# Patient Record
Sex: FEMALE | Race: WHITE | Employment: UNEMPLOYED | URBAN - METROPOLITAN AREA
[De-identification: names, ages, dates, MRNs, and addresses within clinical notes are randomized per-mention and may not be internally consistent; named-entity substitution may affect disease eponyms.]

---

## 2017-01-10 ENCOUNTER — OFFICE VISIT (OUTPATIENT)
Dept: PSYCHOLOGY | Age: 57
End: 2017-01-10

## 2017-01-10 DIAGNOSIS — F41.1 GAD (GENERALIZED ANXIETY DISORDER): Primary | ICD-10-CM

## 2017-01-10 PROCEDURE — 90832 PSYTX W PT 30 MINUTES: CPT | Performed by: PSYCHOLOGIST

## 2017-01-11 PROBLEM — F41.1 GAD (GENERALIZED ANXIETY DISORDER): Status: ACTIVE | Noted: 2017-01-11

## 2017-01-30 ENCOUNTER — OFFICE VISIT (OUTPATIENT)
Dept: INTERNAL MEDICINE CLINIC | Age: 57
End: 2017-01-30

## 2017-01-30 VITALS
WEIGHT: 184.4 LBS | HEART RATE: 80 BPM | HEIGHT: 61 IN | SYSTOLIC BLOOD PRESSURE: 110 MMHG | BODY MASS INDEX: 34.81 KG/M2 | DIASTOLIC BLOOD PRESSURE: 70 MMHG

## 2017-01-30 DIAGNOSIS — I10 ESSENTIAL HYPERTENSION: ICD-10-CM

## 2017-01-30 DIAGNOSIS — E11.9 TYPE 2 DIABETES MELLITUS WITHOUT COMPLICATION, WITHOUT LONG-TERM CURRENT USE OF INSULIN (HCC): Primary | ICD-10-CM

## 2017-01-30 DIAGNOSIS — E78.5 HYPERLIPIDEMIA, UNSPECIFIED HYPERLIPIDEMIA TYPE: ICD-10-CM

## 2017-01-30 DIAGNOSIS — F98.8 ADD (ATTENTION DEFICIT DISORDER): ICD-10-CM

## 2017-01-30 LAB
ESTIMATED AVERAGE GLUCOSE: 145.6 MG/DL
HBA1C MFR BLD: 6.7 %

## 2017-01-30 PROCEDURE — 99214 OFFICE O/P EST MOD 30 MIN: CPT | Performed by: INTERNAL MEDICINE

## 2017-01-30 RX ORDER — DEXTROAMPHETAMINE SACCHARATE, AMPHETAMINE ASPARTATE MONOHYDRATE, DEXTROAMPHETAMINE SULFATE AND AMPHETAMINE SULFATE 7.5; 7.5; 7.5; 7.5 MG/1; MG/1; MG/1; MG/1
30 CAPSULE, EXTENDED RELEASE ORAL EVERY MORNING
Qty: 90 CAPSULE | Refills: 0 | Status: SHIPPED | OUTPATIENT
Start: 2017-01-30 | End: 2017-05-01 | Stop reason: SDUPTHER

## 2017-02-13 ENCOUNTER — OFFICE VISIT (OUTPATIENT)
Dept: INTERNAL MEDICINE CLINIC | Age: 57
End: 2017-02-13

## 2017-02-13 ENCOUNTER — OFFICE VISIT (OUTPATIENT)
Dept: PSYCHOLOGY | Age: 57
End: 2017-02-13

## 2017-02-13 VITALS
TEMPERATURE: 97.9 F | WEIGHT: 186.6 LBS | DIASTOLIC BLOOD PRESSURE: 80 MMHG | SYSTOLIC BLOOD PRESSURE: 130 MMHG | HEIGHT: 61 IN | BODY MASS INDEX: 35.23 KG/M2 | HEART RATE: 84 BPM

## 2017-02-13 DIAGNOSIS — F98.8 ADD (ATTENTION DEFICIT DISORDER): ICD-10-CM

## 2017-02-13 DIAGNOSIS — F41.1 GAD (GENERALIZED ANXIETY DISORDER): Primary | ICD-10-CM

## 2017-02-13 DIAGNOSIS — J02.9 SORE THROAT: Primary | ICD-10-CM

## 2017-02-13 LAB — S PYO AG THROAT QL: NORMAL

## 2017-02-13 PROCEDURE — 99213 OFFICE O/P EST LOW 20 MIN: CPT | Performed by: INTERNAL MEDICINE

## 2017-02-13 PROCEDURE — 90832 PSYTX W PT 30 MINUTES: CPT | Performed by: PSYCHOLOGIST

## 2017-02-13 PROCEDURE — 87880 STREP A ASSAY W/OPTIC: CPT | Performed by: INTERNAL MEDICINE

## 2017-02-22 ENCOUNTER — TELEPHONE (OUTPATIENT)
Dept: INTERNAL MEDICINE CLINIC | Age: 57
End: 2017-02-22

## 2017-02-23 RX ORDER — AZITHROMYCIN 250 MG/1
TABLET, FILM COATED ORAL
Qty: 1 PACKET | Refills: 0 | Status: SHIPPED | OUTPATIENT
Start: 2017-02-23 | End: 2017-03-05

## 2017-02-27 RX ORDER — QUETIAPINE FUMARATE 50 MG/1
TABLET, FILM COATED ORAL
Qty: 135 TABLET | Refills: 3 | Status: SHIPPED | OUTPATIENT
Start: 2017-02-27 | End: 2018-03-13 | Stop reason: SDUPTHER

## 2017-03-20 ENCOUNTER — TELEPHONE (OUTPATIENT)
Dept: INTERNAL MEDICINE CLINIC | Age: 57
End: 2017-03-20

## 2017-04-21 DIAGNOSIS — F98.8 ADD (ATTENTION DEFICIT DISORDER): ICD-10-CM

## 2017-04-21 RX ORDER — DEXTROAMPHETAMINE SACCHARATE, AMPHETAMINE ASPARTATE MONOHYDRATE, DEXTROAMPHETAMINE SULFATE AND AMPHETAMINE SULFATE 7.5; 7.5; 7.5; 7.5 MG/1; MG/1; MG/1; MG/1
30 CAPSULE, EXTENDED RELEASE ORAL EVERY MORNING
Qty: 90 CAPSULE | Refills: 0 | Status: CANCELLED | OUTPATIENT
Start: 2017-04-21

## 2017-04-21 RX ORDER — OMEPRAZOLE 40 MG/1
40 CAPSULE, DELAYED RELEASE ORAL DAILY
Qty: 90 CAPSULE | Refills: 3 | Status: SHIPPED | OUTPATIENT
Start: 2017-04-21 | End: 2018-06-29 | Stop reason: SDUPTHER

## 2017-04-21 RX ORDER — VALSARTAN 80 MG/1
80 TABLET ORAL DAILY
Qty: 90 TABLET | Refills: 3 | Status: SHIPPED | OUTPATIENT
Start: 2017-04-21 | End: 2018-05-30 | Stop reason: SDUPTHER

## 2017-05-01 ENCOUNTER — OFFICE VISIT (OUTPATIENT)
Dept: INTERNAL MEDICINE CLINIC | Age: 57
End: 2017-05-01

## 2017-05-01 VITALS
SYSTOLIC BLOOD PRESSURE: 130 MMHG | WEIGHT: 182 LBS | HEIGHT: 61 IN | HEART RATE: 88 BPM | BODY MASS INDEX: 34.36 KG/M2 | DIASTOLIC BLOOD PRESSURE: 86 MMHG

## 2017-05-01 DIAGNOSIS — Z13.31 POSITIVE DEPRESSION SCREENING: ICD-10-CM

## 2017-05-01 DIAGNOSIS — E78.2 MIXED HYPERLIPIDEMIA: ICD-10-CM

## 2017-05-01 DIAGNOSIS — F98.8 ADD (ATTENTION DEFICIT DISORDER): ICD-10-CM

## 2017-05-01 DIAGNOSIS — I10 ESSENTIAL HYPERTENSION: ICD-10-CM

## 2017-05-01 DIAGNOSIS — E11.9 TYPE 2 DIABETES MELLITUS WITHOUT COMPLICATION, WITHOUT LONG-TERM CURRENT USE OF INSULIN (HCC): Primary | ICD-10-CM

## 2017-05-01 PROCEDURE — G8431 POS CLIN DEPRES SCRN F/U DOC: HCPCS | Performed by: INTERNAL MEDICINE

## 2017-05-01 PROCEDURE — 90471 IMMUNIZATION ADMIN: CPT | Performed by: INTERNAL MEDICINE

## 2017-05-01 PROCEDURE — 99214 OFFICE O/P EST MOD 30 MIN: CPT | Performed by: INTERNAL MEDICINE

## 2017-05-01 PROCEDURE — 90746 HEPB VACCINE 3 DOSE ADULT IM: CPT | Performed by: INTERNAL MEDICINE

## 2017-05-01 PROCEDURE — 90715 TDAP VACCINE 7 YRS/> IM: CPT | Performed by: INTERNAL MEDICINE

## 2017-05-01 PROCEDURE — 90472 IMMUNIZATION ADMIN EACH ADD: CPT | Performed by: INTERNAL MEDICINE

## 2017-05-01 RX ORDER — DEXTROAMPHETAMINE SACCHARATE, AMPHETAMINE ASPARTATE MONOHYDRATE, DEXTROAMPHETAMINE SULFATE AND AMPHETAMINE SULFATE 7.5; 7.5; 7.5; 7.5 MG/1; MG/1; MG/1; MG/1
30 CAPSULE, EXTENDED RELEASE ORAL EVERY MORNING
Qty: 90 CAPSULE | Refills: 0 | Status: SHIPPED | OUTPATIENT
Start: 2017-05-01 | End: 2017-07-31 | Stop reason: SDUPTHER

## 2017-05-02 LAB
ESTIMATED AVERAGE GLUCOSE: 137 MG/DL
HBA1C MFR BLD: 6.4 %

## 2017-05-30 ENCOUNTER — PATIENT MESSAGE (OUTPATIENT)
Dept: INTERNAL MEDICINE CLINIC | Age: 57
End: 2017-05-30

## 2017-05-30 DIAGNOSIS — E66.9 OBESITY (BMI 30-39.9): ICD-10-CM

## 2017-05-30 DIAGNOSIS — E11.9 TYPE 2 DIABETES MELLITUS WITHOUT COMPLICATION, WITHOUT LONG-TERM CURRENT USE OF INSULIN (HCC): Primary | ICD-10-CM

## 2017-05-31 ENCOUNTER — NURSE ONLY (OUTPATIENT)
Dept: INTERNAL MEDICINE CLINIC | Age: 57
End: 2017-05-31

## 2017-05-31 DIAGNOSIS — Z23 IMMUNIZATION DUE: Primary | ICD-10-CM

## 2017-05-31 PROCEDURE — 90746 HEPB VACCINE 3 DOSE ADULT IM: CPT | Performed by: INTERNAL MEDICINE

## 2017-05-31 PROCEDURE — 90471 IMMUNIZATION ADMIN: CPT | Performed by: INTERNAL MEDICINE

## 2017-06-02 ENCOUNTER — OFFICE VISIT (OUTPATIENT)
Dept: INTERNAL MEDICINE CLINIC | Age: 57
End: 2017-06-02

## 2017-06-02 VITALS
HEIGHT: 61 IN | BODY MASS INDEX: 34.74 KG/M2 | WEIGHT: 184 LBS | SYSTOLIC BLOOD PRESSURE: 134 MMHG | DIASTOLIC BLOOD PRESSURE: 88 MMHG | HEART RATE: 80 BPM

## 2017-06-02 DIAGNOSIS — R31.9 HEMATURIA: ICD-10-CM

## 2017-06-02 DIAGNOSIS — M54.50 ACUTE RIGHT-SIDED LOW BACK PAIN WITHOUT SCIATICA: Primary | ICD-10-CM

## 2017-06-02 LAB
ALBUMIN SERPL-MCNC: 4.3 G/DL (ref 3.4–5)
ANION GAP SERPL CALCULATED.3IONS-SCNC: 14 MMOL/L (ref 3–16)
BILIRUBIN, POC: NORMAL
BLOOD URINE, POC: NORMAL
BUN BLDV-MCNC: 13 MG/DL (ref 7–20)
CALCIUM SERPL-MCNC: 10.1 MG/DL (ref 8.3–10.6)
CHLORIDE BLD-SCNC: 104 MMOL/L (ref 99–110)
CLARITY, POC: NORMAL
CO2: 24 MMOL/L (ref 21–32)
COLOR, POC: NORMAL
CREAT SERPL-MCNC: 0.6 MG/DL (ref 0.6–1.1)
GFR AFRICAN AMERICAN: >60
GFR NON-AFRICAN AMERICAN: >60
GLUCOSE BLD-MCNC: 99 MG/DL (ref 70–99)
GLUCOSE URINE, POC: NORMAL
KETONES, POC: NORMAL
LEUKOCYTE EST, POC: NORMAL
NITRITE, POC: NORMAL
PH, POC: 5.5
PHOSPHORUS: 2.9 MG/DL (ref 2.5–4.9)
POTASSIUM SERPL-SCNC: 4.7 MMOL/L (ref 3.5–5.1)
PROTEIN, POC: NORMAL
SODIUM BLD-SCNC: 142 MMOL/L (ref 136–145)
SPECIFIC GRAVITY, POC: 1.02
UROBILINOGEN, POC: 0.2

## 2017-06-02 PROCEDURE — 81002 URINALYSIS NONAUTO W/O SCOPE: CPT | Performed by: NURSE PRACTITIONER

## 2017-06-02 PROCEDURE — 99213 OFFICE O/P EST LOW 20 MIN: CPT | Performed by: NURSE PRACTITIONER

## 2017-06-04 LAB — URINE CULTURE, ROUTINE: NORMAL

## 2017-06-05 ASSESSMENT — ENCOUNTER SYMPTOMS
BACK PAIN: 1
VOMITING: 0
GASTROINTESTINAL NEGATIVE: 1
DIARRHEA: 0
CONSTIPATION: 0
NAUSEA: 0
SHORTNESS OF BREATH: 0

## 2017-06-07 ENCOUNTER — HOSPITAL ENCOUNTER (OUTPATIENT)
Dept: CT IMAGING | Age: 57
Discharge: HOME | End: 2017-06-07
Attending: INTERNAL MEDICINE | Admitting: INTERNAL MEDICINE

## 2017-06-07 DIAGNOSIS — M54.50 LOW BACK PAIN: ICD-10-CM

## 2017-06-07 DIAGNOSIS — R31.9 HEMATURIA: ICD-10-CM

## 2017-06-07 DIAGNOSIS — M54.50 ACUTE RIGHT-SIDED LOW BACK PAIN WITHOUT SCIATICA: ICD-10-CM

## 2017-06-08 ENCOUNTER — TELEPHONE (OUTPATIENT)
Dept: RHEUMATOLOGY | Age: 57
End: 2017-06-08

## 2017-06-08 DIAGNOSIS — R10.11 RUQ ABDOMINAL PAIN: Primary | ICD-10-CM

## 2017-06-08 RX ORDER — TRAMADOL HYDROCHLORIDE 50 MG/1
50 TABLET ORAL EVERY 8 HOURS PRN
Qty: 25 TABLET | Refills: 0 | Status: SHIPPED | OUTPATIENT
Start: 2017-06-08 | End: 2018-01-30 | Stop reason: ALTCHOICE

## 2017-07-31 ENCOUNTER — OFFICE VISIT (OUTPATIENT)
Dept: INTERNAL MEDICINE CLINIC | Age: 57
End: 2017-07-31

## 2017-07-31 VITALS
DIASTOLIC BLOOD PRESSURE: 70 MMHG | BODY MASS INDEX: 33.45 KG/M2 | SYSTOLIC BLOOD PRESSURE: 116 MMHG | HEIGHT: 62 IN | HEART RATE: 88 BPM | WEIGHT: 181.8 LBS

## 2017-07-31 DIAGNOSIS — R21 RASH: ICD-10-CM

## 2017-07-31 DIAGNOSIS — Z23 NEED FOR PROPHYLACTIC VACCINATION AGAINST STREPTOCOCCUS PNEUMONIAE (PNEUMOCOCCUS): ICD-10-CM

## 2017-07-31 DIAGNOSIS — Z12.31 ENCOUNTER FOR SCREENING MAMMOGRAM FOR BREAST CANCER: ICD-10-CM

## 2017-07-31 DIAGNOSIS — I10 ESSENTIAL HYPERTENSION: ICD-10-CM

## 2017-07-31 DIAGNOSIS — F98.8 ADD (ATTENTION DEFICIT DISORDER): ICD-10-CM

## 2017-07-31 DIAGNOSIS — E11.9 TYPE 2 DIABETES MELLITUS WITHOUT COMPLICATION, WITHOUT LONG-TERM CURRENT USE OF INSULIN (HCC): Primary | ICD-10-CM

## 2017-07-31 DIAGNOSIS — E78.2 MIXED HYPERLIPIDEMIA: ICD-10-CM

## 2017-07-31 DIAGNOSIS — Z11.59 NEED FOR HEPATITIS C SCREENING TEST: ICD-10-CM

## 2017-07-31 DIAGNOSIS — Z11.4 SCREENING FOR HIV WITHOUT PRESENCE OF RISK FACTORS: ICD-10-CM

## 2017-07-31 PROCEDURE — 99214 OFFICE O/P EST MOD 30 MIN: CPT | Performed by: INTERNAL MEDICINE

## 2017-07-31 PROCEDURE — 90471 IMMUNIZATION ADMIN: CPT | Performed by: INTERNAL MEDICINE

## 2017-07-31 PROCEDURE — 90732 PPSV23 VACC 2 YRS+ SUBQ/IM: CPT | Performed by: INTERNAL MEDICINE

## 2017-07-31 RX ORDER — DEXTROAMPHETAMINE SACCHARATE, AMPHETAMINE ASPARTATE MONOHYDRATE, DEXTROAMPHETAMINE SULFATE AND AMPHETAMINE SULFATE 7.5; 7.5; 7.5; 7.5 MG/1; MG/1; MG/1; MG/1
30 CAPSULE, EXTENDED RELEASE ORAL EVERY MORNING
Qty: 90 CAPSULE | Refills: 0 | Status: SHIPPED | OUTPATIENT
Start: 2017-07-31 | End: 2017-10-30 | Stop reason: SDUPTHER

## 2017-08-01 LAB
ESTIMATED AVERAGE GLUCOSE: 159.9 MG/DL
HBA1C MFR BLD: 7.2 %
HEPATITIS C ANTIBODY INTERPRETATION: NORMAL
HIV-1 AND HIV-2 ANTIBODIES: NORMAL

## 2017-08-08 ENCOUNTER — TELEPHONE (OUTPATIENT)
Dept: BARIATRICS/WEIGHT MGMT | Age: 57
End: 2017-08-08

## 2017-09-07 RX ORDER — SIMVASTATIN 20 MG
20 TABLET ORAL NIGHTLY
Qty: 90 TABLET | Refills: 3 | Status: SHIPPED | OUTPATIENT
Start: 2017-09-07 | End: 2018-06-29 | Stop reason: SDUPTHER

## 2017-09-14 RX ORDER — CLOTRIMAZOLE AND BETAMETHASONE DIPROPIONATE 10; .64 MG/G; MG/G
CREAM TOPICAL
Qty: 60 G | Refills: 1 | Status: SHIPPED | OUTPATIENT
Start: 2017-09-14 | End: 2018-05-04 | Stop reason: ALTCHOICE

## 2017-10-09 ENCOUNTER — TELEPHONE (OUTPATIENT)
Dept: INTERNAL MEDICINE CLINIC | Age: 57
End: 2017-10-09

## 2017-10-09 DIAGNOSIS — Z01.419 ROUTINE GYNECOLOGICAL EXAMINATION: Primary | ICD-10-CM

## 2017-10-09 DIAGNOSIS — Z01.419 ENCOUNTER FOR GYNECOLOGICAL EXAMINATION: Primary | ICD-10-CM

## 2017-10-09 DIAGNOSIS — Z12.39 BREAST CANCER SCREENING: ICD-10-CM

## 2017-10-09 NOTE — TELEPHONE ENCOUNTER
Pt calling needs an order for Mammogram. Pt also asking for a referral to see a Gynecologist. Please advise.

## 2017-10-09 NOTE — TELEPHONE ENCOUNTER
Pt called stating she needs a  referral for Dr. Cindi Caraballo, her ob/gyn. It is for an annual exam. The dr is located at [address] Her appt is tomorrow.      Fax #: [phone]

## 2017-10-30 ENCOUNTER — OFFICE VISIT (OUTPATIENT)
Dept: INTERNAL MEDICINE CLINIC | Age: 57
End: 2017-10-30

## 2017-10-30 VITALS
HEIGHT: 62 IN | WEIGHT: 182 LBS | DIASTOLIC BLOOD PRESSURE: 74 MMHG | BODY MASS INDEX: 33.49 KG/M2 | HEART RATE: 88 BPM | SYSTOLIC BLOOD PRESSURE: 120 MMHG

## 2017-10-30 DIAGNOSIS — G89.29 CHRONIC BILATERAL THORACIC BACK PAIN: ICD-10-CM

## 2017-10-30 DIAGNOSIS — E78.2 MIXED HYPERLIPIDEMIA: ICD-10-CM

## 2017-10-30 DIAGNOSIS — M54.6 CHRONIC BILATERAL THORACIC BACK PAIN: ICD-10-CM

## 2017-10-30 DIAGNOSIS — E11.9 TYPE 2 DIABETES MELLITUS WITHOUT COMPLICATION, WITHOUT LONG-TERM CURRENT USE OF INSULIN (HCC): Primary | ICD-10-CM

## 2017-10-30 DIAGNOSIS — I10 ESSENTIAL HYPERTENSION: ICD-10-CM

## 2017-10-30 DIAGNOSIS — F98.8 ATTENTION DEFICIT DISORDER, UNSPECIFIED HYPERACTIVITY PRESENCE: ICD-10-CM

## 2017-10-30 PROCEDURE — 99214 OFFICE O/P EST MOD 30 MIN: CPT | Performed by: INTERNAL MEDICINE

## 2017-10-30 RX ORDER — DEXTROAMPHETAMINE SACCHARATE, AMPHETAMINE ASPARTATE MONOHYDRATE, DEXTROAMPHETAMINE SULFATE AND AMPHETAMINE SULFATE 7.5; 7.5; 7.5; 7.5 MG/1; MG/1; MG/1; MG/1
30 CAPSULE, EXTENDED RELEASE ORAL EVERY MORNING
Qty: 90 CAPSULE | Refills: 0 | Status: SHIPPED | OUTPATIENT
Start: 2017-10-30 | End: 2018-01-30 | Stop reason: SDUPTHER

## 2017-11-30 ENCOUNTER — NURSE ONLY (OUTPATIENT)
Dept: INTERNAL MEDICINE CLINIC | Age: 57
End: 2017-11-30

## 2017-11-30 DIAGNOSIS — Z23 NEED FOR HEPATITIS B VACCINATION: Primary | ICD-10-CM

## 2017-11-30 PROCEDURE — 90471 IMMUNIZATION ADMIN: CPT | Performed by: INTERNAL MEDICINE

## 2017-11-30 PROCEDURE — 90746 HEPB VACCINE 3 DOSE ADULT IM: CPT | Performed by: INTERNAL MEDICINE

## 2018-01-24 ENCOUNTER — TELEPHONE (OUTPATIENT)
Dept: INTERNAL MEDICINE CLINIC | Age: 58
End: 2018-01-24

## 2018-01-24 DIAGNOSIS — E11.9 TYPE 2 DIABETES MELLITUS WITHOUT COMPLICATION, WITHOUT LONG-TERM CURRENT USE OF INSULIN (HCC): ICD-10-CM

## 2018-01-24 DIAGNOSIS — I10 ESSENTIAL HYPERTENSION: Primary | ICD-10-CM

## 2018-01-24 DIAGNOSIS — E78.00 HYPERCHOLESTEROLEMIA: ICD-10-CM

## 2018-01-24 NOTE — TELEPHONE ENCOUNTER
Dr. Noel Snog: This patient has an upcoming appointment with you for Diabetes and Hyperlipidemia. In planning for that visit I have completed the following pre-visit planning:     Pre-Visit Planning Checklist:  Patient contacted: yes  Verified patient by name and date of birth: yes    Health Maintenance items reviewed:    No pre-visit planning health maintenance topics to review at this time    Labs and procedures pended: Fasting Hemoglobin A1C , Urine Microalbumin, Lipid Panel  and Renal Panel    Labs and procedures discussed with patient: yes  Reminded patient to check with their insurance company about coverage for lab tests and lab location: yes    Preliminary Medication Reconciliation: was performed. Reminded patient to bring medications to appointment: no    Reminded patient to arrive early: yes    Other notes: Patient declined having lab work completed prior to her upcoming appointment. Patient reports that she is not taking Metformin and will not be restarting it. Please complete the med-reconciliation and sign the appropriate labs as soon as possible.       George Ace  Pre-Services Specialist

## 2018-01-30 ENCOUNTER — OFFICE VISIT (OUTPATIENT)
Dept: INTERNAL MEDICINE CLINIC | Age: 58
End: 2018-01-30

## 2018-01-30 VITALS
SYSTOLIC BLOOD PRESSURE: 144 MMHG | HEIGHT: 62 IN | DIASTOLIC BLOOD PRESSURE: 84 MMHG | BODY MASS INDEX: 33.82 KG/M2 | HEART RATE: 68 BPM | WEIGHT: 183.8 LBS

## 2018-01-30 DIAGNOSIS — Z23 NEED FOR INFLUENZA VACCINATION: ICD-10-CM

## 2018-01-30 DIAGNOSIS — E78.2 MIXED HYPERLIPIDEMIA: ICD-10-CM

## 2018-01-30 DIAGNOSIS — E11.9 TYPE 2 DIABETES MELLITUS WITHOUT COMPLICATION, WITHOUT LONG-TERM CURRENT USE OF INSULIN (HCC): Primary | ICD-10-CM

## 2018-01-30 DIAGNOSIS — I10 ESSENTIAL HYPERTENSION: ICD-10-CM

## 2018-01-30 DIAGNOSIS — E78.00 HYPERCHOLESTEROLEMIA: ICD-10-CM

## 2018-01-30 DIAGNOSIS — E11.9 TYPE 2 DIABETES MELLITUS WITHOUT COMPLICATION, WITHOUT LONG-TERM CURRENT USE OF INSULIN (HCC): ICD-10-CM

## 2018-01-30 DIAGNOSIS — F90.0 ATTENTION DEFICIT HYPERACTIVITY DISORDER (ADHD), PREDOMINANTLY INATTENTIVE TYPE: ICD-10-CM

## 2018-01-30 DIAGNOSIS — H92.01 RIGHT EAR PAIN: ICD-10-CM

## 2018-01-30 LAB
ALBUMIN SERPL-MCNC: 4 G/DL (ref 3.4–5)
ANION GAP SERPL CALCULATED.3IONS-SCNC: 14 MMOL/L (ref 3–16)
BUN BLDV-MCNC: 11 MG/DL (ref 7–20)
CALCIUM SERPL-MCNC: 9.6 MG/DL (ref 8.3–10.6)
CHLORIDE BLD-SCNC: 104 MMOL/L (ref 99–110)
CHOLESTEROL, FASTING: 163 MG/DL (ref 0–199)
CO2: 22 MMOL/L (ref 21–32)
CREAT SERPL-MCNC: 0.6 MG/DL (ref 0.6–1.1)
CREATININE URINE: 133.9 MG/DL (ref 28–259)
GFR AFRICAN AMERICAN: >60
GFR NON-AFRICAN AMERICAN: >60
GLUCOSE BLD-MCNC: 149 MG/DL (ref 70–99)
HDLC SERPL-MCNC: 42 MG/DL (ref 40–60)
LDL CHOLESTEROL CALCULATED: 93 MG/DL
MICROALBUMIN UR-MCNC: 1.6 MG/DL
MICROALBUMIN/CREAT UR-RTO: 11.9 MG/G (ref 0–30)
PHOSPHORUS: 2.7 MG/DL (ref 2.5–4.9)
POTASSIUM SERPL-SCNC: 4.2 MMOL/L (ref 3.5–5.1)
SODIUM BLD-SCNC: 140 MMOL/L (ref 136–145)
TRIGLYCERIDE, FASTING: 138 MG/DL (ref 0–150)
VLDLC SERPL CALC-MCNC: 28 MG/DL

## 2018-01-30 PROCEDURE — 90686 IIV4 VACC NO PRSV 0.5 ML IM: CPT | Performed by: INTERNAL MEDICINE

## 2018-01-30 PROCEDURE — 99214 OFFICE O/P EST MOD 30 MIN: CPT | Performed by: INTERNAL MEDICINE

## 2018-01-30 PROCEDURE — 90471 IMMUNIZATION ADMIN: CPT | Performed by: INTERNAL MEDICINE

## 2018-01-30 RX ORDER — DEXTROAMPHETAMINE SACCHARATE, AMPHETAMINE ASPARTATE MONOHYDRATE, DEXTROAMPHETAMINE SULFATE AND AMPHETAMINE SULFATE 7.5; 7.5; 7.5; 7.5 MG/1; MG/1; MG/1; MG/1
30 CAPSULE, EXTENDED RELEASE ORAL EVERY MORNING
Qty: 90 CAPSULE | Refills: 0 | Status: SHIPPED | OUTPATIENT
Start: 2018-01-30 | End: 2018-04-12 | Stop reason: SDUPTHER

## 2018-01-31 LAB
ESTIMATED AVERAGE GLUCOSE: 154.2 MG/DL
HBA1C MFR BLD: 7 %

## 2018-02-05 ENCOUNTER — TELEPHONE (OUTPATIENT)
Dept: INTERNAL MEDICINE CLINIC | Age: 58
End: 2018-02-05

## 2018-02-06 ENCOUNTER — TELEPHONE (OUTPATIENT)
Dept: INTERNAL MEDICINE CLINIC | Age: 58
End: 2018-02-06

## 2018-02-27 ENCOUNTER — TELEPHONE (OUTPATIENT)
Dept: INTERNAL MEDICINE CLINIC | Age: 58
End: 2018-02-27

## 2018-02-27 RX ORDER — SUMATRIPTAN 20 MG/1
1 SPRAY NASAL DAILY PRN
Qty: 1 INHALER | Refills: 3 | Status: SHIPPED | OUTPATIENT
Start: 2018-02-27 | End: 2018-06-29 | Stop reason: SDUPTHER

## 2018-03-13 ENCOUNTER — PATIENT MESSAGE (OUTPATIENT)
Dept: INTERNAL MEDICINE CLINIC | Age: 58
End: 2018-03-13

## 2018-03-13 RX ORDER — QUETIAPINE FUMARATE 50 MG/1
75 TABLET, FILM COATED ORAL DAILY
Qty: 135 TABLET | Refills: 3 | Status: SHIPPED | OUTPATIENT
Start: 2018-03-13 | End: 2018-11-02 | Stop reason: ALTCHOICE

## 2018-03-15 ENCOUNTER — PATIENT MESSAGE (OUTPATIENT)
Dept: INTERNAL MEDICINE CLINIC | Age: 58
End: 2018-03-15

## 2018-03-15 NOTE — TELEPHONE ENCOUNTER
From: [facility]  To: Rock Grey MD  Sent: 3/15/2018 1:29 PM EDT  Subject: Prescription Question    The pharmacy said the RX request requires prior authorization. I will also require a new louie/strips to go with the RX. Please and thank you.  ----- Message -----  From: Rock Grey MD  Sent: 3/15/2018 1:00 PM EDT  To: [facility]  Subject: RE: Prescription Question  OK. I [facility] a [facility] to [facility]. Rex Holstein     ----- Message -----   [facility]   Sent: 3/15/2018 12:54 PM EDT   To: Rock Grey MD  Subject: RE: Prescription Question    Will you please call in a 30 day RX to [facility]? We can see how it goes , then call in 90 day RX to [facility] if all goes well.  ----- Message -----  From: Rock Grey MD  Sent: 3/15/2018 12:03 PM EDT  To: [facility]  Subject: RE: Prescription Question  Victoza is an excellent option. I can write a 30 day or 90 day prescription. Please let me know which is better for you. Also, let me know where you would like the prescription sent. Rex Holstein     ----- Message -----   From: James Miller   Sent: 3/15/2018 12:00 PM EDT   To: Rock Grey MD  Subject: RE: Prescription Question    I spoke with my insurance provider/[facility]. Januvia is not available in injection form. Changes to insurance coverage makes Victoza affordable but I didn't understand what they meant by   \"2x3\" \"3x3\". Either way, it depends on how the prescription is written - the price is still the same for a 90 day supply. Would you want to write the prescription for Victoza? Would you want to try out a 30 day supply first to see how it goes then write it for 90 days?  ----- Message -----  From: Rock Grey MD  Sent: 3/14/2018 9:16 PM EDT  To: [facility]  Subject: RE: Prescription Question  Ms. Kevin Garcia does not cause weight gain and can be effective as a stand alone treatment. I am more comfortable prescribing this compared to Symlin, as I am more familiar with this medication. I have seen many patients have good results with Januvia. Spenser Moore    ----- Message -----   From: General Mascorro   Sent: 3/13/2018 7:33 PM EDT   To: Dontae Harrington MD  Subject: RE: Prescription Question    I did some investigating: [email or web address]. pdf  It seems that this drug is intended for use in combination with Metformin and/or Pioglitazone. I have negative effects with Metformin. One of the side effects of Pioglitazone seems to be weight gain, and I did not see information on a an injectable 30 day version of this drug. I checked with my insurance, Yadi No is not available in injectable form, only tablet. SYMLIN® (pramlintide acetate) is available in injection form, and its RX can be called in for 30 or 90 days. Do you think I can try it for 30 days w/monitoring of bloodsugars to see how it works out? I am not sure I want to try something that adds weight, and that is a 30 day dose.  ----- Message -----  From: Dontae Harrington MD  Sent: 3/13/2018 4:54 PM EDT  To: [facility]  Subject: RE: Prescription Question  Ms. Chandra Welch can definitely be a cause of elevated glucose levels. I would suggest a trial of a medication called Januvia since Victoza was too expensive. This is a pill that is taken once daily by mouth. Would you like me to send in a prescription to your pharmacy? Spenser Moore     ----- Message -----   From:  [employer]   Sent: 3/13/2018 2:32 PM EDT   To: Dontae Harrington MD  Subject: Prescription Question    Dr. Linda Mirza,    My bloodsugar has been running higher than usual even though I have been managing well through self moderation. For the past two weeks it has been running in the range of 160-180.    I am staying on task with my meds, taking a multivitamin (Centrum for women) plus

## 2018-04-12 DIAGNOSIS — R21 RASH: Primary | ICD-10-CM

## 2018-04-12 RX ORDER — DEXTROAMPHETAMINE SACCHARATE, AMPHETAMINE ASPARTATE MONOHYDRATE, DEXTROAMPHETAMINE SULFATE AND AMPHETAMINE SULFATE 7.5; 7.5; 7.5; 7.5 MG/1; MG/1; MG/1; MG/1
30 CAPSULE, EXTENDED RELEASE ORAL EVERY MORNING
Qty: 90 CAPSULE | Refills: 0 | Status: SHIPPED | OUTPATIENT
Start: 2018-04-12 | End: 2018-05-04 | Stop reason: SDUPTHER

## 2018-05-04 ENCOUNTER — OFFICE VISIT (OUTPATIENT)
Dept: INTERNAL MEDICINE CLINIC | Age: 58
End: 2018-05-04

## 2018-05-04 VITALS
HEIGHT: 62 IN | WEIGHT: 180.6 LBS | SYSTOLIC BLOOD PRESSURE: 122 MMHG | HEART RATE: 96 BPM | DIASTOLIC BLOOD PRESSURE: 84 MMHG | BODY MASS INDEX: 33.23 KG/M2

## 2018-05-04 DIAGNOSIS — I10 ESSENTIAL HYPERTENSION: ICD-10-CM

## 2018-05-04 DIAGNOSIS — E11.9 TYPE 2 DIABETES MELLITUS WITHOUT COMPLICATION, WITHOUT LONG-TERM CURRENT USE OF INSULIN (HCC): Primary | ICD-10-CM

## 2018-05-04 DIAGNOSIS — E78.2 MIXED HYPERLIPIDEMIA: ICD-10-CM

## 2018-05-04 DIAGNOSIS — F90.0 ATTENTION DEFICIT HYPERACTIVITY DISORDER (ADHD), PREDOMINANTLY INATTENTIVE TYPE: ICD-10-CM

## 2018-05-04 LAB — HBA1C MFR BLD: 7.1 %

## 2018-05-04 PROCEDURE — 83036 HEMOGLOBIN GLYCOSYLATED A1C: CPT | Performed by: INTERNAL MEDICINE

## 2018-05-04 PROCEDURE — 99214 OFFICE O/P EST MOD 30 MIN: CPT | Performed by: INTERNAL MEDICINE

## 2018-05-04 RX ORDER — DEXTROAMPHETAMINE SACCHARATE, AMPHETAMINE ASPARTATE MONOHYDRATE, DEXTROAMPHETAMINE SULFATE AND AMPHETAMINE SULFATE 7.5; 7.5; 7.5; 7.5 MG/1; MG/1; MG/1; MG/1
30 CAPSULE, EXTENDED RELEASE ORAL EVERY MORNING
Qty: 90 CAPSULE | Refills: 0 | Status: SHIPPED | OUTPATIENT
Start: 2018-05-04 | End: 2018-08-03 | Stop reason: SDUPTHER

## 2018-06-29 RX ORDER — SIMVASTATIN 20 MG
20 TABLET ORAL NIGHTLY
Qty: 90 TABLET | Refills: 3 | Status: SHIPPED | OUTPATIENT
Start: 2018-06-29 | End: 2019-05-01 | Stop reason: SDUPTHER

## 2018-06-29 RX ORDER — SUMATRIPTAN 20 MG/1
1 SPRAY NASAL DAILY PRN
Qty: 1 INHALER | Refills: 3 | Status: SHIPPED | OUTPATIENT
Start: 2018-06-29 | End: 2018-11-30 | Stop reason: SDUPTHER

## 2018-06-29 RX ORDER — OMEPRAZOLE 40 MG/1
40 CAPSULE, DELAYED RELEASE ORAL DAILY
Qty: 90 CAPSULE | Refills: 3 | Status: SHIPPED | OUTPATIENT
Start: 2018-06-29

## 2018-07-18 ENCOUNTER — PAT TELEPHONE (OUTPATIENT)
Dept: PREADMISSION TESTING | Age: 58
End: 2018-07-18

## 2018-07-18 VITALS — WEIGHT: 180 LBS | BODY MASS INDEX: 33.13 KG/M2 | HEIGHT: 62 IN

## 2018-07-18 NOTE — PROGRESS NOTES
[address] [facility] time__0915__________        Surgery time__1015__________    Take the following medications with a sip of water:    Do not eat or drink anything after 12:00 midnight prior to your surgery. This includes water chewing gum, mints and ice chips. You may brush your teeth and gargle the morning of your surgery, but do not swallow the water      You may be asked to stop blood thinners such as Coumadin, Plavix, Fragmin, Lovenox, etc., or any anti-inflammatories such as:  Aspirin, Ibuprofen, Advil, Naproxen prior to your surgery. We also ask that you stop any OTC medications such as fish oil, vitamin E, glucosamine, garlic, Multivitamins, COQ 10, etc.    We ask that you do not smoke 24 hours prior to surgery  We ask that you do not  drink any alcoholic beverages 24 hours prior to surgery     You must make arrangements for a responsible adult to take you home after your surgery. For your safety you will not be allowed to leave alone or drive yourself home. Your surgery will be cancelled if you do not have a ride home. Also for your safety, it is strongly suggested that someone stay with you the first 24 hours after your surgery. A parent or legal guardian must accompany a child scheduled for surgery and plan to stay at the hospital until the child is discharged. Please do not bring other children with you. For your comfort, please wear simple loose fitting clothing to the hospital.  Please do not bring valuables. Do not wear any make-up or nail polish on your fingers or toes      For your safety, please do not wear any jewelry or body piercing's on the day of surgery. All jewelry must be removed. If you have dentures, they will be removed before going to operating room. For your convenience, we will provide you with a container. If you wear contact lenses or glasses, they will be removed, please bring a case for them.      If

## 2018-07-19 ENCOUNTER — TELEPHONE (OUTPATIENT)
Dept: INTERNAL MEDICINE CLINIC | Age: 58
End: 2018-07-19

## 2018-07-19 NOTE — TELEPHONE ENCOUNTER
Attempted to contact patient on 7/19/2018. Result: left message on the patient's voicemail asking patient to return my call. Pre-Visit planning not completed.        Shayna Cuevas  Patient Services Specialist  [phone]

## 2018-07-20 ENCOUNTER — HOSPITAL ENCOUNTER (OUTPATIENT)
Dept: ENDOSCOPY | Age: 58
Discharge: HOME | End: 2018-07-20
Attending: INTERNAL MEDICINE | Admitting: INTERNAL MEDICINE

## 2018-07-20 VITALS
TEMPERATURE: 98.8 F | RESPIRATION RATE: 16 BRPM | WEIGHT: 179 LBS | HEIGHT: 62 IN | OXYGEN SATURATION: 98 % | BODY MASS INDEX: 32.94 KG/M2 | DIASTOLIC BLOOD PRESSURE: 79 MMHG | SYSTOLIC BLOOD PRESSURE: 136 MMHG | HEART RATE: 97 BPM

## 2018-07-20 DIAGNOSIS — R14.0 ABDOMINAL DISTENSION (GASEOUS): ICD-10-CM

## 2018-07-20 LAB
GLUCOSE BLD-MCNC: 86 MG/DL (ref 70–99)
PERFORMED ON: NORMAL

## 2018-07-20 RX ORDER — SODIUM CHLORIDE 9 MG/ML
INJECTION, SOLUTION INTRAVENOUS CONTINUOUS
Status: DISCONTINUED | OUTPATIENT
Start: 2018-07-20 | End: 2018-07-21 | Stop reason: HOSPADM

## 2018-07-20 RX ORDER — SODIUM CHLORIDE 0.9 % (FLUSH) 0.9 %
10 SYRINGE (ML) INJECTION PRN
Status: DISCONTINUED | OUTPATIENT
Start: 2018-07-20 | End: 2018-07-21 | Stop reason: HOSPADM

## 2018-07-20 RX ORDER — SODIUM CHLORIDE 0.9 % (FLUSH) 0.9 %
10 SYRINGE (ML) INJECTION EVERY 12 HOURS SCHEDULED
Status: DISCONTINUED | OUTPATIENT
Start: 2018-07-20 | End: 2018-07-21 | Stop reason: HOSPADM

## 2018-07-20 RX ADMIN — SODIUM CHLORIDE: 9 INJECTION, SOLUTION INTRAVENOUS at 09:54

## 2018-07-20 ASSESSMENT — LIFESTYLE VARIABLES: SMOKING_STATUS: 0

## 2018-07-20 ASSESSMENT — PAIN - FUNCTIONAL ASSESSMENT: PAIN_FUNCTIONAL_ASSESSMENT: 0-10

## 2018-07-20 ASSESSMENT — PAIN SCALES - GENERAL: PAINLEVEL_OUTOF10: 0

## 2018-07-20 NOTE — ANESTHESIA PRE-OP
valsartan (DIOVAN) 80 MG tablet TAKE 1 TABLET DAILY 90 tablet 0    amphetamine-dextroamphetamine (ADDERALL XR) 30 MG extended release capsule Take 1 capsule by mouth every morning for 90 days. . 90 capsule 0    glucose blood VI test strips (ASCENSIA AUTODISC VI;ONE TOUCH ULTRA TEST VI) strip 1 each by In Vitro route daily As needed. 100 each 3    Blood Glucose Monitoring Suppl DEEPA 1 Device by Does not apply route daily 1 Device 0    QUEtiapine (SEROQUEL) 50 MG tablet Take 1.5 tablets by mouth daily 135 tablet 3     No current facility-administered medications on file prior to encounter. Current Outpatient Prescriptions   Medication Sig Dispense Refill    omeprazole (PRILOSEC) 40 MG delayed release capsule Take 1 capsule by mouth daily 90 capsule 3    simvastatin (ZOCOR) 20 MG tablet Take 1 tablet by mouth nightly 90 tablet 3    SUMAtriptan (IMITREX) 20 MG/ACT nasal spray 1 spray by Nasal route daily as needed for Migraine 1 Inhaler 3    Exenatide (BYDUREON) 2 MG PEN Inject 1 pen into the skin once a week 12 pen 3    valsartan (DIOVAN) 80 MG tablet TAKE 1 TABLET DAILY 90 tablet 0    amphetamine-dextroamphetamine (ADDERALL XR) 30 MG extended release capsule Take 1 capsule by mouth every morning for 90 days. . 90 capsule 0    glucose blood VI test strips (ASCENSIA AUTODISC VI;ONE TOUCH ULTRA TEST VI) strip 1 each by In Vitro route daily As needed. 100 each 3    Blood Glucose Monitoring Suppl DEEPA 1 Device by Does not apply route daily 1 Device 0    QUEtiapine (SEROQUEL) 50 MG tablet Take 1.5 tablets by mouth daily 135 tablet 3     No current facility-administered medications for this encounter. Vital Signs (Current) There were no vitals filed for this visit. Vital Signs Statistics (for past 48 hrs)     No Data Recorded    BP Readings from Last 3 Encounters:   05/04/18 122/84   01/30/18 (!) 144/84   10/30/17 120/74     BMI  There is no height or weight on file to calculate BMI.   Estimated body mass index is 32.92 kg/m² as calculated from the following:    Height as of 7/18/18: 5' 2\" (1.575 m). Weight as of 7/18/18: 180 lb (81.6 kg). CBC   Lab Results   Component Value Date    WBC 10.7 09/14/2015    RBC 4.78 09/14/2015    HGB 13.5 09/14/2015    HCT 41.8 09/14/2015    MCV 87.5 09/14/2015    RDW 14.2 09/14/2015     09/14/2015     CMP    Lab Results   Component Value Date     01/30/2018    K 4.2 01/30/2018     01/30/2018    CO2 22 01/30/2018    BUN 11 01/30/2018    CREATININE 0.6 01/30/2018    GFRAA >60 01/30/2018    AGRATIO 1.4 03/03/2014    LABGLOM >60 01/30/2018    GLUCOSE 149 01/30/2018    PROT 7.1 03/03/2014    CALCIUM 9.6 01/30/2018    BILITOT 0.4 03/03/2014    ALKPHOS 132 03/03/2014    AST 57 03/03/2014    ALT 74 03/03/2014     BMP    Lab Results   Component Value Date     01/30/2018    K 4.2 01/30/2018     01/30/2018    CO2 22 01/30/2018    BUN 11 01/30/2018    CREATININE 0.6 01/30/2018    CALCIUM 9.6 01/30/2018    GFRAA >60 01/30/2018    LABGLOM >60 01/30/2018    GLUCOSE 149 01/30/2018     POCGlucose  No results for input(s): GLUCOSE in the last 72 hours.    Coags  No results found for: PROTIME, INR, APTT  HCG (If Applicable) No results found for: PREGTESTUR, PREGSERUM, HCG, HCGQUANT   ABGs No results found for: PHART, PO2ART, NON2IJL, IZM1MBR, BEART, S9YVURDQ   Type & Screen (If Applicable)  No results found for: LABABO, LABRH                         BMI: Wt Readings from Last 3 Encounters:       NPO Status:  >8h                          Anesthesia Evaluation  Patient summary reviewed no history of anesthetic complications:   Airway: Mallampati: II  TM distance: >3 FB     Mouth opening: > = 3 FB Dental:          Pulmonary:Negative Pulmonary ROS breath sounds clear to auscultation      (-) COPD, asthma and not a current smoker                           Cardiovascular:  Exercise tolerance: good (>4 METS),   (+) hypertension:, hyperlipidemia        Rhythm: regular  Rate: normal                    Neuro/Psych:   (+) psychiatric history: stable with treatmentdepression/anxiety              ROS comment: Neurofibromatosis 2 GI/Hepatic/Renal:   (+) GERD:, liver disease:,           Endo/Other:    (+) DiabetesType II DM, , .                 Abdominal:           Vascular:     - DVT and PE. Anesthesia Plan      TIVA     ASA 3       Induction: intravenous. Anesthetic plan and risks discussed with patient. Plan discussed with CRNA. This pre-anesthesia assessment may be used as a history and physical.    DOS STAFF ADDENDUM:    Pt seen and examined, chart reviewed (including anesthesia, drug and allergy history). No interval changes to history and physical examination. Anesthetic plan, risks, benefits, alternatives, and personnel involved discussed with patient. Patient verbalized an understanding and agrees to proceed.       Chas Flor MD  July 20, 2018  9:29 AM

## 2018-07-20 NOTE — PROGRESS NOTES
Dr. Josep Pichardo here. Discharge instructions discussed with pt. And her . Understanding verbalized.

## 2018-07-20 NOTE — ANESTHESIA POST-OP
[facility] Department of Anesthesiology  Post-Anesthesia Note       Name:  Lauren Ledezma                                  Age:  62 y.o. MRN:  [id number]     Last Vitals & Oxygen Saturation: /79   Pulse 97   Temp 98.8 °F (37.1 °C) (Temporal)   Resp 16   Ht 5' 2\" (1.575 m)   Wt 179 lb (81.2 kg)   SpO2 98%   BMI 32.74 kg/m²   Patient Vitals for the past 4 hrs:   BP Temp Temp src Pulse Resp SpO2   07/20/18 1044 136/79 - - 97 16 98 %   07/20/18 1034 123/74 - - 94 16 94 %   07/20/18 1024 124/76 98.8 °F (37.1 °C) Temporal 94 16 94 %       Level of consciousness:  Awake, alert    Respiratory: Respirations easy, no distress. Stable. Cardiovascular: Hemodynamically stable. Hydration: Adequate. PONV: Adequately managed. Post-op pain: Adequately controlled. Post-op assessment: Tolerated anesthetic well without complication. Complications:  None.     Shanon Mcfarlane MD  July 20, 2018   1:42 PM

## 2018-07-23 ENCOUNTER — PAT TELEPHONE (OUTPATIENT)
Dept: PREADMISSION TESTING | Age: 58
End: 2018-07-23

## 2018-07-23 ENCOUNTER — TELEPHONE (OUTPATIENT)
Dept: INTERNAL MEDICINE CLINIC | Age: 58
End: 2018-07-23

## 2018-07-23 VITALS — WEIGHT: 180 LBS | HEIGHT: 62 IN | BODY MASS INDEX: 33.13 KG/M2

## 2018-07-23 NOTE — TELEPHONE ENCOUNTER
Pt called and said that she had an endoscopy done at [address] on Friday 7/20/18       She is wanting to make sure that dr Derik Pruitt knew this and she was asking it if was \"scanned to her chart\"

## 2018-07-23 NOTE — PROGRESS NOTES
them.     If you have a living will and a durable power of  for healthcare, please bring in a copy. As part of our patient safety program to minimize surgical site infections, we ask you to do the following:    · Please notify your surgeon if you develop any illness between         now and the  day of your surgery. · This includes a cough, cold, fever, sore throat, nausea,         or vomiting, and diarrhea, etc.  ·  Please notify your surgeon if you experience dizziness, shortness         of breath or blurred vision between now and the time of your surgery. You may shower the night before surgery or the morning of   your surgery with an antibacterial soap. You will need to bring a photo ID and insurance card    [facility] has an onsite pharmacy, would you like to utilize our pharmacy     If you will be staying overnight and use a C-pap machine, please bring   your C-pap to hospital     Our goal is to provide you with excellent care, therefore, visitors will be limited to two(2) in the room at a time so that we may focus on providing this care for you. Please contact pre-admission testing if you have any further questions. [facility] phone number:  [phone]  Please note these are generalized instructions for all surgical cases, you may be provided with more specific instructions according to your surgery.

## 2018-07-27 ENCOUNTER — HOSPITAL ENCOUNTER (OUTPATIENT)
Dept: ENDOSCOPY | Age: 58
Discharge: HOME | End: 2018-07-27
Attending: INTERNAL MEDICINE | Admitting: INTERNAL MEDICINE

## 2018-07-27 VITALS
BODY MASS INDEX: 32.41 KG/M2 | SYSTOLIC BLOOD PRESSURE: 131 MMHG | RESPIRATION RATE: 16 BRPM | WEIGHT: 176.13 LBS | HEIGHT: 62 IN | TEMPERATURE: 97.2 F | HEART RATE: 81 BPM | DIASTOLIC BLOOD PRESSURE: 76 MMHG | OXYGEN SATURATION: 97 %

## 2018-07-27 DIAGNOSIS — R10.9 ABDOMINAL PAIN: ICD-10-CM

## 2018-07-27 LAB
GLUCOSE BLD-MCNC: 91 MG/DL (ref 70–99)
PERFORMED ON: NORMAL

## 2018-07-27 RX ORDER — SODIUM CHLORIDE 0.9 % (FLUSH) 0.9 %
10 SYRINGE (ML) INJECTION PRN
Status: DISCONTINUED | OUTPATIENT
Start: 2018-07-27 | End: 2018-07-28 | Stop reason: HOSPADM

## 2018-07-27 RX ORDER — SODIUM CHLORIDE 9 MG/ML
INJECTION, SOLUTION INTRAVENOUS CONTINUOUS
Status: DISCONTINUED | OUTPATIENT
Start: 2018-07-27 | End: 2018-07-28 | Stop reason: HOSPADM

## 2018-07-27 RX ORDER — LIDOCAINE HYDROCHLORIDE 10 MG/ML
1 INJECTION, SOLUTION EPIDURAL; INFILTRATION; INTRACAUDAL; PERINEURAL
Status: ACTIVE | OUTPATIENT
Start: 2018-07-27 | End: 2018-07-27

## 2018-07-27 RX ORDER — SODIUM CHLORIDE 0.9 % (FLUSH) 0.9 %
10 SYRINGE (ML) INJECTION EVERY 12 HOURS SCHEDULED
Status: DISCONTINUED | OUTPATIENT
Start: 2018-07-27 | End: 2018-07-28 | Stop reason: HOSPADM

## 2018-07-27 RX ORDER — SODIUM CHLORIDE, SODIUM LACTATE, POTASSIUM CHLORIDE, CALCIUM CHLORIDE 600; 310; 30; 20 MG/100ML; MG/100ML; MG/100ML; MG/100ML
INJECTION, SOLUTION INTRAVENOUS CONTINUOUS
Status: DISCONTINUED | OUTPATIENT
Start: 2018-07-27 | End: 2018-07-27 | Stop reason: SDUPTHER

## 2018-07-27 RX ADMIN — SODIUM CHLORIDE: 9 INJECTION, SOLUTION INTRAVENOUS at 07:08

## 2018-07-27 ASSESSMENT — PAIN SCALES - GENERAL
PAINLEVEL_OUTOF10: 0

## 2018-07-27 ASSESSMENT — PAIN - FUNCTIONAL ASSESSMENT: PAIN_FUNCTIONAL_ASSESSMENT: 0-10

## 2018-07-27 NOTE — ANESTHESIA PRE-OP
Department of Anesthesiology  Preprocedure Note       Name:  Tawana Dominguez   Age:  62 y.o.  :  1960                                          MRN:  [id number]         Date:  2018      Surgeon:    Procedure:    Medications prior to admission:   Prior to Admission medications    Medication Sig Start Date End Date Taking? Authorizing Provider   omeprazole (PRILOSEC) 40 MG delayed release capsule Take 1 capsule by mouth daily 18  Yes Arcelia Ward MD   SUMAtriptan Teofilo Mccollum) 20 MG/ACT nasal spray 1 spray by Nasal route daily as needed for Migraine 18 Yes Arcelia Ward MD   Exenatide (BYDUREON) 2 MG PEN Inject 1 pen into the skin once a week 18  Yes Arcelia Ward MD   valsartan (DIOVAN) 80 MG tablet TAKE 1 TABLET DAILY 18  Yes Arcelia Ward MD   amphetamine-dextroamphetamine (ADDERALL XR) 30 MG extended release capsule Take 1 capsule by mouth every morning for 90 days. . 18 Yes Arcelia Ward MD   QUEtiapine (SEROQUEL) 50 MG tablet Take 1.5 tablets by mouth daily 3/13/18  Yes Arcelia Ward MD   simvastatin (ZOCOR) 20 MG tablet Take 1 tablet by mouth nightly 18   Arcelia Ward MD   glucose blood VI test strips (ASCENSIA AUTODISC VI;ONE TOUCH ULTRA TEST VI) strip 1 each by In Vitro route daily As needed.  3/30/18   Arcelia Ward MD   Blood Glucose Monitoring Suppl DEEPA 1 Device by Does not apply route daily 3/15/18   Arcelia Ward MD       Current medications:    Current Outpatient Prescriptions   Medication Sig Dispense Refill    omeprazole (PRILOSEC) 40 MG delayed release capsule Take 1 capsule by mouth daily 90 capsule 3    SUMAtriptan (IMITREX) 20 MG/ACT nasal spray 1 spray by Nasal route daily as needed for Migraine 1 Inhaler 3    Exenatide (BYDUREON) 2 MG PEN Inject 1 pen into the skin once a week 12 pen 3    valsartan (DIOVAN) 80 MG tablet TAKE 1 TABLET DAILY 90 tablet 0    amphetamine-dextroamphetamine (ADDERALL XR) 30 MG extended release capsule Take 1 capsule by mouth every morning for 90 days. . 90 capsule 0    QUEtiapine (SEROQUEL) 50 MG tablet Take 1.5 tablets by mouth daily 135 tablet 3    simvastatin (ZOCOR) 20 MG tablet Take 1 tablet by mouth nightly 90 tablet 3    glucose blood VI test strips (ASCENSIA AUTODISC VI;ONE TOUCH ULTRA TEST VI) strip 1 each by In Vitro route daily As needed. 100 each 3    Blood Glucose Monitoring Suppl DEEPA 1 Device by Does not apply route daily 1 Device 0     Current Facility-Administered Medications   Medication Dose Route Frequency Provider Last Rate Last Dose    sodium chloride flush 0.9 % injection 10 mL  10 mL Intravenous 2 times per day Lamount Levo, MD        sodium chloride flush 0.9 % injection 10 mL  10 mL Intravenous PRN Lamount Levo MD        lidocaine PF 1 % injection 1 mL  1 mL Intradermal Once PRN Lamount Levo MD        0.9 % sodium chloride infusion   Intravenous Continuous Lamount Levo  mL/hr at 07/27/18 0708         Allergies:     Allergies   Allergen Reactions    Metformin And Related      Loose stool       Problem List:    Patient Active Problem List   Diagnosis Code    Type 2 diabetes mellitus without complication, without long-term current use of insulin (HCC) E11.9    HTN (hypertension) I10    Hyperlipidemia E78.5    GERD (gastroesophageal reflux disease) K21.9    Depression F32.9    Fatty liver disease, nonalcoholic N47.0    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[facility] [address] Q85.02    ADD (attention deficit disorder) F98.8    Dyslipidemia E78.5    DAMARIS (generalized anxiety disorder) F41.1       Past Medical History:        Diagnosis Date    Depression     GERD (gastroesophageal reflux disease)     Hyperlipidemia     Hypertension     Type II or unspecified type diabetes mellitus without mention of complication, not stated as uncontrolled        Past Surgical History: Procedure Laterality Date     SECTION  1984+    CHOLECYSTECTOMY  2010    COLONOSCOPY  2018    EYE SURGERY  2008    Cataract     EYE SURGERY      lens replacement     HYSTERECTOMY  2007    partial    UPPER GASTROINTESTINAL ENDOSCOPY      with dilitation       Social History:    Social History   Substance Use Topics    Smoking status: Former Smoker     Quit date: 2012    Smokeless tobacco: Never Used    Alcohol use 1.0 oz/week     2 Standard drinks or equivalent per week      Comment: socially                                 Counseling given: Not Answered      Vital Signs (Current):   Vitals:    18 0711   BP: (!) 146/76   Pulse: 88   Resp: 18   Temp: 97.8 °F (36.6 °C)   TempSrc: Temporal   SpO2: 98%   Weight: 176 lb 2 oz (79.9 kg)   Height: 5' 2\" (1.575 m)                                              BP Readings from Last 3 Encounters:   18 (!) 146/76   18 136/79   18 122/84       NPO Status: Time of last liquid consumption: 0100                        Time of last solid consumption: 1800                        Date of last liquid consumption: 18                        Date of last solid food consumption: 18    BMI:   Wt Readings from Last 3 Encounters:   18 176 lb 2 oz (79.9 kg)   18 180 lb (81.6 kg)   18 179 lb (81.2 kg)     Body mass index is 32.21 kg/m². Anesthesia Evaluation    Airway: Mallampati: III  TM distance: >3 FB   Neck ROM: full  Mouth opening: > = 3 FB Dental:          Pulmonary:                              Cardiovascular:    (+) hypertension:,                   Neuro/Psych:               GI/Hepatic/Renal:   (+) GERD:, liver disease:,           Endo/Other:    (+) Diabetes, . Abdominal:           Vascular:                                        Anesthesia Plan      general     ASA 3     (I discussed with the patient the risks and benefits of PIV, general anesthesia, IV Narcotics, PACU.   All questions

## 2018-07-27 NOTE — ANESTHESIA POST-OP
Postoperative Anesthesia Note    Name:    Sosa Seay  MRN:      [id number]    Patient Vitals for the past 12 hrs:   BP Temp Temp src Pulse Resp SpO2 Height Weight   07/27/18 0807 131/76 - - 81 16 97 % - -   07/27/18 0753 115/69 - - 81 16 97 % - -   07/27/18 0744 120/72 97.2 °F (36.2 °C) Temporal 83 18 97 % - -   07/27/18 0711 (!) 146/76 97.8 °F (36.6 °C) Temporal 88 18 98 % 5' 2\" (1.575 m) 176 lb 2 oz (79.9 kg)        LABS:    CBC  Lab Results   Component Value Date/Time    WBC 10.7 09/14/2015 04:26 PM    HGB 13.5 09/14/2015 04:26 PM    HCT 41.8 09/14/2015 04:26 PM     09/14/2015 04:26 PM     RENAL  Lab Results   Component Value Date/Time     01/30/2018 10:50 AM    K 4.2 01/30/2018 10:50 AM     01/30/2018 10:50 AM    CO2 22 01/30/2018 10:50 AM    BUN 11 01/30/2018 10:50 AM    CREATININE 0.6 01/30/2018 10:50 AM    GLUCOSE 149 (H) 01/30/2018 10:50 AM     COAGS  No results found for: PROTIME, INR, APTT    Intake & Output: In: 300 [I.V.:300]  Out: -     Nausea & Vomiting:  No    Level of Consciousness:  Awake    Pain Assessment:  Adequate analgesia    Anesthesia Complications:  No apparent anesthetic complications    SUMMARY      Vital signs stable  OK to discharge from Stage I post anesthesia care.   Care transferred from Anesthesiology department on discharge from perioperative area

## 2018-07-27 NOTE — H&P
[facility]   Pre-operative History and Physical    Patient: Caroline Mirza  : 1960  Acct#: [de-identified]    History Obtained From: electronic medical record    HISTORY OF PRESENT ILLNESS  Procedure:Colonoscopy  Indications:abdominal pain  Past Medical History:        Diagnosis Date    Depression     GERD (gastroesophageal reflux disease)     Hyperlipidemia     Hypertension     Type II or unspecified type diabetes mellitus without mention of complication, not stated as uncontrolled      Past Surgical History:        Procedure Laterality Date     SECTION  +    CHOLECYSTECTOMY      COLONOSCOPY  2018    EYE SURGERY      Cataract     EYE SURGERY      lens replacement     HYSTERECTOMY      partial    UPPER GASTROINTESTINAL ENDOSCOPY      with dilitation     Medications Prior to Admission:   Current Outpatient Prescriptions on File Prior to Encounter   Medication Sig Dispense Refill    omeprazole (PRILOSEC) 40 MG delayed release capsule Take 1 capsule by mouth daily 90 capsule 3    SUMAtriptan (IMITREX) 20 MG/ACT nasal spray 1 spray by Nasal route daily as needed for Migraine 1 Inhaler 3    Exenatide (BYDUREON) 2 MG PEN Inject 1 pen into the skin once a week 12 pen 3    valsartan (DIOVAN) 80 MG tablet TAKE 1 TABLET DAILY 90 tablet 0    amphetamine-dextroamphetamine (ADDERALL XR) 30 MG extended release capsule Take 1 capsule by mouth every morning for 90 days. . 90 capsule 0    QUEtiapine (SEROQUEL) 50 MG tablet Take 1.5 tablets by mouth daily 135 tablet 3    simvastatin (ZOCOR) 20 MG tablet Take 1 tablet by mouth nightly 90 tablet 3    glucose blood VI test strips (ASCENSIA AUTODISC VI;ONE TOUCH ULTRA TEST VI) strip 1 each by In Vitro route daily As needed. 100 each 3    Blood Glucose Monitoring Suppl DEEPA 1 Device by Does not apply route daily 1 Device 0     No current facility-administered medications on file prior to encounter.       Allergies:  Metformin

## 2018-08-03 ENCOUNTER — OFFICE VISIT (OUTPATIENT)
Dept: INTERNAL MEDICINE CLINIC | Age: 58
End: 2018-08-03

## 2018-08-03 VITALS
DIASTOLIC BLOOD PRESSURE: 76 MMHG | HEIGHT: 62 IN | HEART RATE: 88 BPM | WEIGHT: 179.4 LBS | BODY MASS INDEX: 33.01 KG/M2 | SYSTOLIC BLOOD PRESSURE: 138 MMHG

## 2018-08-03 DIAGNOSIS — I10 ESSENTIAL HYPERTENSION: ICD-10-CM

## 2018-08-03 DIAGNOSIS — E11.9 TYPE 2 DIABETES MELLITUS WITHOUT COMPLICATION, WITHOUT LONG-TERM CURRENT USE OF INSULIN (HCC): Primary | ICD-10-CM

## 2018-08-03 DIAGNOSIS — F90.0 ATTENTION DEFICIT HYPERACTIVITY DISORDER (ADHD), PREDOMINANTLY INATTENTIVE TYPE: ICD-10-CM

## 2018-08-03 DIAGNOSIS — E78.2 MIXED HYPERLIPIDEMIA: ICD-10-CM

## 2018-08-03 PROCEDURE — 99214 OFFICE O/P EST MOD 30 MIN: CPT | Performed by: INTERNAL MEDICINE

## 2018-08-03 RX ORDER — DEXTROAMPHETAMINE SACCHARATE, AMPHETAMINE ASPARTATE MONOHYDRATE, DEXTROAMPHETAMINE SULFATE AND AMPHETAMINE SULFATE 7.5; 7.5; 7.5; 7.5 MG/1; MG/1; MG/1; MG/1
30 CAPSULE, EXTENDED RELEASE ORAL EVERY MORNING
Qty: 90 CAPSULE | Refills: 0 | Status: SHIPPED | OUTPATIENT
Start: 2018-08-03 | End: 2018-11-02 | Stop reason: SDUPTHER

## 2018-08-07 ENCOUNTER — TELEPHONE (OUTPATIENT)
Dept: INTERNAL MEDICINE CLINIC | Age: 58
End: 2018-08-07

## 2018-08-07 NOTE — TELEPHONE ENCOUNTER
Pt advised through VM again that we did not order this as KEELY so she needs to call the pharmacy to see what is going on.

## 2018-08-20 ENCOUNTER — PATIENT MESSAGE (OUTPATIENT)
Dept: INTERNAL MEDICINE CLINIC | Age: 58
End: 2018-08-20

## 2018-08-20 RX ORDER — LOSARTAN POTASSIUM 50 MG/1
50 TABLET ORAL DAILY
Qty: 90 TABLET | Refills: 3 | Status: SHIPPED | OUTPATIENT
Start: 2018-08-20 | End: 2019-05-01 | Stop reason: RX

## 2018-10-18 ENCOUNTER — OFFICE VISIT (OUTPATIENT)
Dept: INTERNAL MEDICINE CLINIC | Age: 58
End: 2018-10-18
Payer: OTHER GOVERNMENT

## 2018-10-18 ENCOUNTER — TELEPHONE (OUTPATIENT)
Dept: INTERNAL MEDICINE CLINIC | Age: 58
End: 2018-10-18

## 2018-10-18 VITALS
HEIGHT: 62 IN | BODY MASS INDEX: 35.7 KG/M2 | DIASTOLIC BLOOD PRESSURE: 86 MMHG | WEIGHT: 194 LBS | HEART RATE: 88 BPM | SYSTOLIC BLOOD PRESSURE: 136 MMHG

## 2018-10-18 DIAGNOSIS — H90.11 CONDUCTIVE HEARING LOSS OF RIGHT EAR, UNSPECIFIED HEARING STATUS ON CONTRALATERAL SIDE: ICD-10-CM

## 2018-10-18 DIAGNOSIS — Z01.818 PREOP EXAMINATION: Primary | ICD-10-CM

## 2018-10-18 DIAGNOSIS — Z23 NEED FOR INFLUENZA VACCINATION: ICD-10-CM

## 2018-10-18 LAB
ALBUMIN SERPL-MCNC: 4.1 G/DL (ref 3.4–5)
ANION GAP SERPL CALCULATED.3IONS-SCNC: 13 MMOL/L (ref 3–16)
BUN BLDV-MCNC: 12 MG/DL (ref 7–20)
CALCIUM SERPL-MCNC: 9.8 MG/DL (ref 8.3–10.6)
CHLORIDE BLD-SCNC: 109 MMOL/L (ref 99–110)
CO2: 23 MMOL/L (ref 21–32)
CREAT SERPL-MCNC: 0.7 MG/DL (ref 0.6–1.1)
GFR AFRICAN AMERICAN: >60
GFR NON-AFRICAN AMERICAN: >60
GLUCOSE BLD-MCNC: 110 MG/DL (ref 70–99)
PHOSPHORUS: 2.9 MG/DL (ref 2.5–4.9)
POTASSIUM SERPL-SCNC: 4.1 MMOL/L (ref 3.5–5.1)
SODIUM BLD-SCNC: 145 MMOL/L (ref 136–145)

## 2018-10-18 PROCEDURE — 93000 ELECTROCARDIOGRAM COMPLETE: CPT | Performed by: NURSE PRACTITIONER

## 2018-10-18 PROCEDURE — G0444 DEPRESSION SCREEN ANNUAL: HCPCS | Performed by: NURSE PRACTITIONER

## 2018-10-18 PROCEDURE — 90682 RIV4 VACC RECOMBINANT DNA IM: CPT | Performed by: NURSE PRACTITIONER

## 2018-10-18 PROCEDURE — 99214 OFFICE O/P EST MOD 30 MIN: CPT | Performed by: NURSE PRACTITIONER

## 2018-10-18 PROCEDURE — 90471 IMMUNIZATION ADMIN: CPT | Performed by: NURSE PRACTITIONER

## 2018-10-18 ASSESSMENT — PATIENT HEALTH QUESTIONNAIRE - PHQ9
8. MOVING OR SPEAKING SO SLOWLY THAT OTHER PEOPLE COULD HAVE NOTICED. OR THE OPPOSITE, BEING SO FIGETY OR RESTLESS THAT YOU HAVE BEEN MOVING AROUND A LOT MORE THAN USUAL: 0
SUM OF ALL RESPONSES TO PHQ QUESTIONS 1-9: 2
6. FEELING BAD ABOUT YOURSELF - OR THAT YOU ARE A FAILURE OR HAVE LET YOURSELF OR YOUR FAMILY DOWN: 0
1. LITTLE INTEREST OR PLEASURE IN DOING THINGS: 0
4. FEELING TIRED OR HAVING LITTLE ENERGY: 0
SUM OF ALL RESPONSES TO PHQ9 QUESTIONS 1 & 2: 0
10. IF YOU CHECKED OFF ANY PROBLEMS, HOW DIFFICULT HAVE THESE PROBLEMS MADE IT FOR YOU TO DO YOUR WORK, TAKE CARE OF THINGS AT HOME, OR GET ALONG WITH OTHER PEOPLE: 1
9. THOUGHTS THAT YOU WOULD BE BETTER OFF DEAD, OR OF HURTING YOURSELF: 0
5. POOR APPETITE OR OVEREATING: 0
2. FEELING DOWN, DEPRESSED OR HOPELESS: 0
3. TROUBLE FALLING OR STAYING ASLEEP: 2
7. TROUBLE CONCENTRATING ON THINGS, SUCH AS READING THE NEWSPAPER OR WATCHING TELEVISION: 0
SUM OF ALL RESPONSES TO PHQ QUESTIONS 1-9: 2

## 2018-10-19 ENCOUNTER — TELEPHONE (OUTPATIENT)
Dept: INTERNAL MEDICINE CLINIC | Age: 58
End: 2018-10-19

## 2018-10-19 NOTE — TELEPHONE ENCOUNTER
Della calling from [facility] ---they have gotten the preop papers except for the Social History is missing--they need that----please fax to [phone]---[id number].

## 2018-11-02 ENCOUNTER — OFFICE VISIT (OUTPATIENT)
Dept: INTERNAL MEDICINE CLINIC | Age: 58
End: 2018-11-02
Payer: OTHER GOVERNMENT

## 2018-11-02 VITALS
BODY MASS INDEX: 34.19 KG/M2 | DIASTOLIC BLOOD PRESSURE: 82 MMHG | WEIGHT: 185.8 LBS | HEART RATE: 84 BPM | HEIGHT: 62 IN | SYSTOLIC BLOOD PRESSURE: 122 MMHG

## 2018-11-02 DIAGNOSIS — E11.9 TYPE 2 DIABETES MELLITUS WITHOUT COMPLICATION, WITHOUT LONG-TERM CURRENT USE OF INSULIN (HCC): Primary | ICD-10-CM

## 2018-11-02 DIAGNOSIS — F51.01 PRIMARY INSOMNIA: ICD-10-CM

## 2018-11-02 DIAGNOSIS — I10 ESSENTIAL HYPERTENSION: ICD-10-CM

## 2018-11-02 DIAGNOSIS — E78.2 MIXED HYPERLIPIDEMIA: ICD-10-CM

## 2018-11-02 DIAGNOSIS — F90.0 ATTENTION DEFICIT HYPERACTIVITY DISORDER (ADHD), PREDOMINANTLY INATTENTIVE TYPE: ICD-10-CM

## 2018-11-02 LAB — HBA1C MFR BLD: 5.8 %

## 2018-11-02 PROCEDURE — 83036 HEMOGLOBIN GLYCOSYLATED A1C: CPT | Performed by: INTERNAL MEDICINE

## 2018-11-02 PROCEDURE — 99214 OFFICE O/P EST MOD 30 MIN: CPT | Performed by: INTERNAL MEDICINE

## 2018-11-02 RX ORDER — DEXTROAMPHETAMINE SACCHARATE, AMPHETAMINE ASPARTATE MONOHYDRATE, DEXTROAMPHETAMINE SULFATE AND AMPHETAMINE SULFATE 7.5; 7.5; 7.5; 7.5 MG/1; MG/1; MG/1; MG/1
30 CAPSULE, EXTENDED RELEASE ORAL EVERY MORNING
Qty: 90 CAPSULE | Refills: 0 | Status: SHIPPED | OUTPATIENT
Start: 2018-11-02 | End: 2019-01-31 | Stop reason: SDUPTHER

## 2018-11-02 RX ORDER — FLUTICASONE PROPIONATE 50 MCG
2 SPRAY, SUSPENSION (ML) NASAL DAILY PRN
COMMUNITY

## 2018-11-12 RX ORDER — TRAZODONE HYDROCHLORIDE 50 MG/1
50 TABLET ORAL NIGHTLY PRN
Qty: 30 TABLET | Refills: 0 | Status: SHIPPED | OUTPATIENT
Start: 2018-11-12 | End: 2018-11-19 | Stop reason: SDUPTHER

## 2018-11-19 RX ORDER — TRAZODONE HYDROCHLORIDE 50 MG/1
150 TABLET ORAL NIGHTLY PRN
Qty: 90 TABLET | Refills: 1 | Status: SHIPPED | OUTPATIENT
Start: 2018-11-19 | End: 2019-02-01 | Stop reason: SDUPTHER

## 2018-11-30 RX ORDER — SUMATRIPTAN 20 MG/1
1 SPRAY NASAL DAILY PRN
Qty: 3 INHALER | Refills: 3 | Status: SHIPPED | OUTPATIENT
Start: 2018-11-30 | End: 2019-11-30

## 2019-01-31 ENCOUNTER — OFFICE VISIT (OUTPATIENT)
Dept: INTERNAL MEDICINE CLINIC | Age: 59
End: 2019-01-31
Payer: OTHER GOVERNMENT

## 2019-01-31 VITALS
BODY MASS INDEX: 32.94 KG/M2 | HEART RATE: 92 BPM | DIASTOLIC BLOOD PRESSURE: 88 MMHG | HEIGHT: 62 IN | WEIGHT: 179 LBS | SYSTOLIC BLOOD PRESSURE: 138 MMHG

## 2019-01-31 DIAGNOSIS — E78.2 MIXED HYPERLIPIDEMIA: ICD-10-CM

## 2019-01-31 DIAGNOSIS — F51.01 PRIMARY INSOMNIA: ICD-10-CM

## 2019-01-31 DIAGNOSIS — F90.0 ATTENTION DEFICIT HYPERACTIVITY DISORDER (ADHD), PREDOMINANTLY INATTENTIVE TYPE: ICD-10-CM

## 2019-01-31 DIAGNOSIS — E11.9 TYPE 2 DIABETES MELLITUS WITHOUT COMPLICATION, WITHOUT LONG-TERM CURRENT USE OF INSULIN (HCC): Primary | ICD-10-CM

## 2019-01-31 DIAGNOSIS — I10 ESSENTIAL HYPERTENSION: ICD-10-CM

## 2019-01-31 DIAGNOSIS — H91.91 HEARING LOSS OF RIGHT EAR, UNSPECIFIED HEARING LOSS TYPE: ICD-10-CM

## 2019-01-31 LAB — HBA1C MFR BLD: 5.6 %

## 2019-01-31 PROCEDURE — 99214 OFFICE O/P EST MOD 30 MIN: CPT | Performed by: INTERNAL MEDICINE

## 2019-01-31 PROCEDURE — 83036 HEMOGLOBIN GLYCOSYLATED A1C: CPT | Performed by: INTERNAL MEDICINE

## 2019-01-31 RX ORDER — DEXTROAMPHETAMINE SACCHARATE, AMPHETAMINE ASPARTATE MONOHYDRATE, DEXTROAMPHETAMINE SULFATE AND AMPHETAMINE SULFATE 7.5; 7.5; 7.5; 7.5 MG/1; MG/1; MG/1; MG/1
30 CAPSULE, EXTENDED RELEASE ORAL EVERY MORNING
Qty: 90 CAPSULE | Refills: 0 | Status: SHIPPED | OUTPATIENT
Start: 2019-01-31 | End: 2019-05-01 | Stop reason: SDUPTHER

## 2019-02-01 RX ORDER — TRAZODONE HYDROCHLORIDE 50 MG/1
125 TABLET ORAL NIGHTLY PRN
Qty: 225 TABLET | Refills: 1 | Status: SHIPPED | OUTPATIENT
Start: 2019-02-01 | End: 2019-05-22 | Stop reason: SDUPTHER

## 2019-02-01 RX ORDER — LOSARTAN POTASSIUM 50 MG/1
50 TABLET ORAL DAILY
Qty: 90 TABLET | Refills: 3 | Status: CANCELLED | OUTPATIENT
Start: 2019-02-01

## 2019-02-01 RX ORDER — OLMESARTAN MEDOXOMIL 20 MG/1
20 TABLET ORAL DAILY
Qty: 90 TABLET | Refills: 3 | Status: SHIPPED | OUTPATIENT
Start: 2019-02-01 | End: 2019-05-01 | Stop reason: SDUPTHER

## 2019-02-19 ENCOUNTER — OFFICE VISIT (OUTPATIENT)
Dept: ENT CLINIC | Age: 59
End: 2019-02-19
Payer: OTHER GOVERNMENT

## 2019-02-19 ENCOUNTER — OFFICE VISIT (OUTPATIENT)
Dept: AUDIOLOGY | Age: 59
End: 2019-02-19
Payer: OTHER GOVERNMENT

## 2019-02-19 ENCOUNTER — PROCEDURE VISIT (OUTPATIENT)
Dept: AUDIOLOGY | Age: 59
End: 2019-02-19
Payer: OTHER GOVERNMENT

## 2019-02-19 VITALS — DIASTOLIC BLOOD PRESSURE: 74 MMHG | HEART RATE: 88 BPM | OXYGEN SATURATION: 97 % | SYSTOLIC BLOOD PRESSURE: 120 MMHG

## 2019-02-19 DIAGNOSIS — H66.91 CHRONIC OTITIS MEDIA OF RIGHT EAR: Primary | ICD-10-CM

## 2019-02-19 DIAGNOSIS — H83.01 VESTIBULITIS OF EAR, RIGHT: ICD-10-CM

## 2019-02-19 DIAGNOSIS — H72.91 CHRONIC OTITIS MEDIA OF RIGHT EAR WITH POSTERIOR PERFORATION: Primary | ICD-10-CM

## 2019-02-19 DIAGNOSIS — H66.91 CHRONIC OTITIS MEDIA OF RIGHT EAR WITH POSTERIOR PERFORATION: Primary | ICD-10-CM

## 2019-02-19 PROCEDURE — 99203 OFFICE O/P NEW LOW 30 MIN: CPT | Performed by: OTOLARYNGOLOGY

## 2019-02-19 PROCEDURE — 92557 COMPREHENSIVE HEARING TEST: CPT | Performed by: AUDIOLOGIST

## 2019-02-19 PROCEDURE — 92550 TYMPANOMETRY & REFLEX THRESH: CPT | Performed by: AUDIOLOGIST

## 2019-02-19 RX ORDER — PREDNISONE 10 MG/1
TABLET ORAL
Qty: 25 TABLET | Refills: 0 | Status: SHIPPED | OUTPATIENT
Start: 2019-02-19 | End: 2019-05-01 | Stop reason: ALTCHOICE

## 2019-02-19 ASSESSMENT — ENCOUNTER SYMPTOMS
SINUS PRESSURE: 0
TROUBLE SWALLOWING: 0
VOICE CHANGE: 0
RHINORRHEA: 0
FACIAL SWELLING: 0
SINUS PAIN: 0
ALLERGIC/IMMUNOLOGIC NEGATIVE: 1
EYES NEGATIVE: 1
RESPIRATORY NEGATIVE: 1
SORE THROAT: 0

## 2019-03-04 ENCOUNTER — TELEPHONE (OUTPATIENT)
Dept: ENT CLINIC | Age: 59
End: 2019-03-04

## 2019-03-04 DIAGNOSIS — H90.5 HEARING LOSS, SENSORINEURAL, UNILATERAL: Primary | ICD-10-CM

## 2019-03-04 DIAGNOSIS — N28.9 KIDNEY DISEASE: ICD-10-CM

## 2019-03-06 ENCOUNTER — TELEPHONE (OUTPATIENT)
Dept: ENT CLINIC | Age: 59
End: 2019-03-06

## 2019-03-06 DIAGNOSIS — N28.9 KIDNEY DISEASE: Primary | ICD-10-CM

## 2019-03-08 ENCOUNTER — PATIENT MESSAGE (OUTPATIENT)
Dept: INTERNAL MEDICINE CLINIC | Age: 59
End: 2019-03-08

## 2019-03-11 ENCOUNTER — HOSPITAL ENCOUNTER (OUTPATIENT)
Dept: MRI IMAGING | Age: 59
Discharge: HOME | End: 2019-03-11
Payer: OTHER GOVERNMENT

## 2019-03-11 DIAGNOSIS — H90.5 HEARING LOSS, SENSORINEURAL, UNILATERAL: ICD-10-CM

## 2019-03-11 PROCEDURE — 70553 MRI BRAIN STEM W/O & W/DYE: CPT

## 2019-03-11 PROCEDURE — 6360000004 HC RX CONTRAST MEDICATION: Performed by: OTOLARYNGOLOGY

## 2019-03-11 PROCEDURE — A9577 INJ MULTIHANCE: HCPCS | Performed by: OTOLARYNGOLOGY

## 2019-03-11 RX ADMIN — GADOBENATE DIMEGLUMINE 15 ML: 529 INJECTION, SOLUTION INTRAVENOUS at 08:48

## 2019-03-12 ENCOUNTER — TELEPHONE (OUTPATIENT)
Dept: ENT CLINIC | Age: 59
End: 2019-03-12

## 2019-03-26 RX ORDER — LANCETS 30 GAUGE
1 EACH MISCELLANEOUS DAILY
Qty: 100 EACH | Refills: 3 | Status: SHIPPED | OUTPATIENT
Start: 2019-03-26

## 2019-03-27 ENCOUNTER — OFFICE VISIT (OUTPATIENT)
Dept: ENT CLINIC | Age: 59
End: 2019-03-27
Payer: OTHER GOVERNMENT

## 2019-03-27 VITALS — SYSTOLIC BLOOD PRESSURE: 124 MMHG | DIASTOLIC BLOOD PRESSURE: 62 MMHG | HEART RATE: 95 BPM | OXYGEN SATURATION: 93 %

## 2019-03-27 DIAGNOSIS — H66.91 CHRONIC OTITIS MEDIA OF RIGHT EAR WITH POSTERIOR PERFORATION: Primary | ICD-10-CM

## 2019-03-27 DIAGNOSIS — H72.91 CHRONIC OTITIS MEDIA OF RIGHT EAR WITH POSTERIOR PERFORATION: Primary | ICD-10-CM

## 2019-03-27 PROCEDURE — 99213 OFFICE O/P EST LOW 20 MIN: CPT | Performed by: OTOLARYNGOLOGY

## 2019-04-09 ENCOUNTER — TELEPHONE (OUTPATIENT)
Dept: ENT CLINIC | Age: 59
End: 2019-04-09

## 2019-04-10 ENCOUNTER — TELEPHONE (OUTPATIENT)
Dept: ENT CLINIC | Age: 59
End: 2019-04-10

## 2019-04-15 ENCOUNTER — TELEPHONE (OUTPATIENT)
Dept: INTERNAL MEDICINE CLINIC | Age: 59
End: 2019-04-15

## 2019-04-15 DIAGNOSIS — E11.9 TYPE 2 DIABETES MELLITUS WITHOUT COMPLICATION, WITHOUT LONG-TERM CURRENT USE OF INSULIN (HCC): Primary | ICD-10-CM

## 2019-05-01 ENCOUNTER — OFFICE VISIT (OUTPATIENT)
Dept: INTERNAL MEDICINE CLINIC | Age: 59
End: 2019-05-01
Payer: OTHER GOVERNMENT

## 2019-05-01 VITALS
HEART RATE: 92 BPM | TEMPERATURE: 98.4 F | DIASTOLIC BLOOD PRESSURE: 74 MMHG | WEIGHT: 168.2 LBS | HEIGHT: 62 IN | BODY MASS INDEX: 30.95 KG/M2 | SYSTOLIC BLOOD PRESSURE: 120 MMHG

## 2019-05-01 DIAGNOSIS — I10 ESSENTIAL HYPERTENSION: ICD-10-CM

## 2019-05-01 DIAGNOSIS — K76.0 FATTY LIVER DISEASE, NONALCOHOLIC: ICD-10-CM

## 2019-05-01 DIAGNOSIS — F32.5 MAJOR DEPRESSIVE DISORDER IN FULL REMISSION, UNSPECIFIED WHETHER RECURRENT (HCC): ICD-10-CM

## 2019-05-01 DIAGNOSIS — K21.9 GASTROESOPHAGEAL REFLUX DISEASE WITHOUT ESOPHAGITIS: ICD-10-CM

## 2019-05-01 DIAGNOSIS — Z01.818 PRE-OP EXAM: Primary | ICD-10-CM

## 2019-05-01 DIAGNOSIS — E11.9 TYPE 2 DIABETES MELLITUS WITHOUT COMPLICATION, WITHOUT LONG-TERM CURRENT USE OF INSULIN (HCC): ICD-10-CM

## 2019-05-01 DIAGNOSIS — E78.2 MIXED HYPERLIPIDEMIA: ICD-10-CM

## 2019-05-01 DIAGNOSIS — Z00.00 HEALTHCARE MAINTENANCE: ICD-10-CM

## 2019-05-01 DIAGNOSIS — F90.0 ATTENTION DEFICIT HYPERACTIVITY DISORDER (ADHD), PREDOMINANTLY INATTENTIVE TYPE: ICD-10-CM

## 2019-05-01 DIAGNOSIS — H72.91 CHRONIC OTITIS MEDIA OF RIGHT EAR WITH POSTERIOR PERFORATION: ICD-10-CM

## 2019-05-01 DIAGNOSIS — H66.91 CHRONIC OTITIS MEDIA OF RIGHT EAR WITH POSTERIOR PERFORATION: ICD-10-CM

## 2019-05-01 LAB
A/G RATIO: 1.3 (ref 1.1–2.2)
ALBUMIN SERPL-MCNC: 4.1 G/DL (ref 3.4–5)
ALP BLD-CCNC: 137 U/L (ref 40–129)
ALT SERPL-CCNC: 20 U/L (ref 10–40)
ANION GAP SERPL CALCULATED.3IONS-SCNC: 13 MMOL/L (ref 3–16)
AST SERPL-CCNC: 16 U/L (ref 15–37)
BILIRUB SERPL-MCNC: 0.5 MG/DL (ref 0–1)
BUN BLDV-MCNC: 17 MG/DL (ref 7–20)
CALCIUM SERPL-MCNC: 10 MG/DL (ref 8.3–10.6)
CHLORIDE BLD-SCNC: 105 MMOL/L (ref 99–110)
CHOLESTEROL, TOTAL: 151 MG/DL (ref 0–199)
CO2: 24 MMOL/L (ref 21–32)
CREAT SERPL-MCNC: 0.9 MG/DL (ref 0.6–1.1)
GFR AFRICAN AMERICAN: >60
GFR NON-AFRICAN AMERICAN: >60
GLOBULIN: 3.1 G/DL
GLUCOSE BLD-MCNC: 96 MG/DL (ref 70–99)
HDLC SERPL-MCNC: 46 MG/DL (ref 40–60)
LDL CHOLESTEROL CALCULATED: 81 MG/DL
POTASSIUM SERPL-SCNC: 4.5 MMOL/L (ref 3.5–5.1)
SODIUM BLD-SCNC: 142 MMOL/L (ref 136–145)
TOTAL PROTEIN: 7.2 G/DL (ref 6.4–8.2)
TRIGL SERPL-MCNC: 122 MG/DL (ref 0–150)
VLDLC SERPL CALC-MCNC: 24 MG/DL

## 2019-05-01 PROCEDURE — 99244 OFF/OP CNSLTJ NEW/EST MOD 40: CPT | Performed by: NURSE PRACTITIONER

## 2019-05-01 RX ORDER — SIMVASTATIN 20 MG
20 TABLET ORAL NIGHTLY
Qty: 90 TABLET | Refills: 3 | Status: SHIPPED | OUTPATIENT
Start: 2019-05-01

## 2019-05-01 RX ORDER — DEXTROAMPHETAMINE SACCHARATE, AMPHETAMINE ASPARTATE MONOHYDRATE, DEXTROAMPHETAMINE SULFATE AND AMPHETAMINE SULFATE 7.5; 7.5; 7.5; 7.5 MG/1; MG/1; MG/1; MG/1
30 CAPSULE, EXTENDED RELEASE ORAL EVERY MORNING
Qty: 90 CAPSULE | Refills: 0 | Status: SHIPPED | OUTPATIENT
Start: 2019-05-01 | End: 2019-07-30

## 2019-05-01 RX ORDER — OLMESARTAN MEDOXOMIL 20 MG/1
20 TABLET ORAL DAILY
Qty: 90 TABLET | Refills: 3 | Status: SHIPPED | OUTPATIENT
Start: 2019-05-01

## 2019-05-01 ASSESSMENT — ENCOUNTER SYMPTOMS
APNEA: 0
DIARRHEA: 0
SINUS PRESSURE: 0
CONSTIPATION: 0
BLOOD IN STOOL: 0
RHINORRHEA: 0
BACK PAIN: 0
EYE PAIN: 0
WHEEZING: 0
ABDOMINAL DISTENTION: 0
VOMITING: 0
SHORTNESS OF BREATH: 0
CHEST TIGHTNESS: 0
ABDOMINAL PAIN: 0
EYE REDNESS: 0
COUGH: 0
NAUSEA: 0

## 2019-05-01 NOTE — PROGRESS NOTES
5/1/2019    This is a 62 y.o. female   Chief Complaint   Patient presents with    Pre-op Exam     right tympanoplasty by Dr. Temo Flores at [facility] on 5/8/19   . HPI     Kings Jewell is a 62 y.o.  female who comes for a preoperative exam.  She is referred by Dr. Temo Flores at [facility] on 5/8/2019 for perioperative risk determination for upcoming surgery for right tympanoplasty. Denies taking any asa, blood thinners, fish oil, NSAIDs or herbals. Denies any previous complications with anesthesia. Patient returns to the office for follow up of her diabetes. Herlast hemoglobin A1c was 5.6. She is compliant with her medications but admits to a lot of dietary noncompliance. Patient has no symptoms related to his condition such as blurred vision, slurred speech, chest pain or shortness of breath. Kings Jewell returns for follow up of hypertension. Patient has been taking Her medications as prescribed. Patient's blood pressure is  controlled. Side effects related to taking the medications include no medication side effects noted    Patient returns for follow up of hyperlipidemia. Patient has been taking Her medications as prescribed. Patient's lipids are controlled. Side effects related to taking the medications include none. She has not had clinical consequences related to hyperlipidemia including, but not limited to acute coronary syndrome, stroke or chronic kidney disease. Kings Jewell comes for depression. She does not take medication for Her condition. She is not suicidal or homicidal.  The depression is not associated with excessive sleeping, anhedonia or anxiety. Patient does not have bipolar disease.      GERD- on ppi  Doing well on medication and denies s/e    ADHD - on adderall xr and doing well with symptom management   Denies s/e     Going back to school, needs TB screening but is unable to come back to have read in 48-72 hr.      Past Medical History:   Diagnosis Date    Depression     GERD (gastroesophageal reflux disease)     Hyperlipidemia     Hypertension     Type II or unspecified type diabetes mellitus without mention of complication, not stated as uncontrolled      Past Surgical History:   Procedure Laterality Date     SECTION  1984+    CHOLECYSTECTOMY  2010    COLONOSCOPY  2018    bernal    EYE SURGERY  2008    Cataract     EYE SURGERY      lens replacement     HYSTERECTOMY  2007    partial    UPPER GASTROINTESTINAL ENDOSCOPY      with dilitation     Social History     Socioeconomic History    Marital status:      Spouse name: Not on file    Number of children: Not on file    Years of education: Not on file    Highest education level: Not on file   Occupational History    Not on file   Social Needs    Financial resource strain: Not on file    Food insecurity:     Worry: Not on file     Inability: Not on file    Transportation needs:     Medical: Not on file     Non-medical: Not on file   Tobacco Use    Smoking status: Former Smoker     Packs/day: 1.00     Years: 35.00     Pack years: 35.00     Last attempt to quit: 2012     Years since quittin.7    Smokeless tobacco: Never Used   Substance and Sexual Activity    Alcohol use:  Yes     Alcohol/week: 1.0 oz     Types: 2 Standard drinks or equivalent per week     Comment: socially     Drug use: No    Sexual activity: Yes     Partners: Male     Comment:     Lifestyle    Physical activity:     Days per week: Not on file     Minutes per session: Not on file    Stress: Not on file   Relationships    Social connections:     Talks on phone: Not on file     Gets together: Not on file     Attends Christianity service: Not on file     Active member of club or organization: Not on file     Attends meetings of clubs or organizations: Not on file     Relationship status: Not on file    Intimate partner violence:     Fear of current or ex partner: Not on file     Emotionally abused: Not on file Physically abused: Not on file     Forced sexual activity: Not on file   Other Topics Concern    Not on file   Social History Narrative    Not on file       Family History   Problem Relation Age of Onset    Cancer Mother        Current Outpatient Medications   Medication Sig Dispense Refill    blood glucose monitor kit and supplies 1 kit by Other route daily 1 kit 0    blood glucose test strips (ASCENSIA AUTODISC VI;ONE TOUCH ULTRA TEST VI) strip 1 each by In Vitro route daily And as needed. 100 each 3    Lancets MISC 1 each by Does not apply route daily And as needed. 100 each 3    olmesartan (BENICAR) 20 MG tablet Take 1 tablet by mouth daily 90 tablet 3    traZODone (DESYREL) 50 MG tablet Take 2.5 tablets by mouth nightly as needed for Sleep 225 tablet 1    amphetamine-dextroamphetamine (ADDERALL XR) 30 MG extended release capsule Take 1 capsule by mouth every morning for 90 days. . 90 capsule 0    fluticasone (FLONASE) 50 MCG/ACT nasal spray 2 sprays by Each Nare route daily      omeprazole (PRILOSEC) 40 MG delayed release capsule Take 1 capsule by mouth daily 90 capsule 3    simvastatin (ZOCOR) 20 MG tablet Take 1 tablet by mouth nightly 90 tablet 3    Exenatide (BYDUREON) 2 MG PEN Inject 1 pen into the skin once a week 12 pen 3    SUMAtriptan (IMITREX) 20 MG/ACT nasal spray 1 spray by Nasal route daily as needed for Migraine 3 Inhaler 3     No current facility-administered medications for this visit. Allergies   Allergen Reactions    Metformin And Related      Loose stool       Office Visit on 01/31/2019   Component Date Value Ref Range Status    Hemoglobin A1C 01/31/2019 5.6  % Final     Review of Systems   Constitutional: Negative for appetite change, chills, fatigue and fever. HENT: Negative for congestion, ear pain, rhinorrhea and sinus pressure. Eyes: Negative for pain, redness and visual disturbance.    Respiratory: Negative for apnea, cough, chest tightness, shortness of breath and wheezing. Cardiovascular: Negative for chest pain, palpitations and leg swelling. Gastrointestinal: Negative for abdominal distention, abdominal pain, blood in stool, constipation, diarrhea, nausea and vomiting. Endocrine: Negative for cold intolerance, heat intolerance, polydipsia, polyphagia and polyuria. Genitourinary: Negative for difficulty urinating, dysuria, enuresis, frequency, hematuria and urgency. Musculoskeletal: Negative for back pain, gait problem, joint swelling and neck pain. Skin: Negative for rash and wound. Allergic/Immunologic: Negative for environmental allergies, food allergies and immunocompromised state. Neurological: Negative for dizziness, syncope, light-headedness, numbness and headaches. Hematological: Negative for adenopathy. Does not bruise/bleed easily. Psychiatric/Behavioral: Negative for agitation, behavioral problems and confusion. The patient is not nervous/anxious. /74   Pulse 92   Temp 98.4 °F (36.9 °C) (Oral)   Ht 5' 2\" (1.575 m)   Wt 168 lb 3.2 oz (76.3 kg)   BMI 30.76 kg/m²     Physical Exam   Constitutional: She is oriented to person, place, and time. She appears well-developed and well-nourished. No distress. HENT:   Head: Normocephalic and atraumatic. Right Ear: Ear canal normal. Tympanic membrane is perforated. Tympanic membrane is not erythematous, not retracted and not bulging. Left Ear: Tympanic membrane and ear canal normal.   Mouth/Throat: No oropharyngeal exudate. Eyes: Pupils are equal, round, and reactive to light. EOM are normal.   Neck: Normal range of motion. Neck supple. Cardiovascular: Normal rate, regular rhythm, normal heart sounds and intact distal pulses. No murmur heard. Pulmonary/Chest: Effort normal and breath sounds normal. No respiratory distress. She has no wheezes. Abdominal: Soft. Bowel sounds are normal. She exhibits no distension. Musculoskeletal: Normal range of motion.  She exhibits no edema.   Lymphadenopathy:     She has no cervical adenopathy. Neurological: She is alert and oriented to person, place, and time. She has normal reflexes. Coordination normal.   Skin: Skin is warm and dry. No rash noted. She is not diaphoretic. No erythema. No pallor. Psychiatric: She has a normal mood and affect. Thought content normal.     Diagnosis  Assessment and Plan  1. Pre-op exam  Perioperative risk related to the patient's upcoming surgery is considered low. She is cleared for surgery. Pre-op exam was completed on 5/1/19    DVT prophylaxis per surgery team.    2. Chronic otitis media of right ear with posterior perforation  Stable, controlled on current regimen     3. Type 2 diabetes mellitus without complication, without long-term current use of insulin (HCC)  Stable, controlled on current regimen. - Hemoglobin A1C; Future  - Microalbumin / Creatinine Urine Ratio; Future    4. Essential hypertension  Stable, controlled on current regimen. - Comprehensive Metabolic Panel; Future    5. Mixed hyperlipidemia  Stable, controlled on current regimen.     - Lipid Panel; Future    6. Major depressive disorder in full remission, unspecified whether recurrent ([address]  Stable, controlled on current regimen. 7. Gastroesophageal reflux disease without esophagitis  Stable, controlled on current regimen. 8. Fatty liver disease, nonalcoholic  Stable, controlled on current regimen. - Comprehensive Metabolic Panel; Future    Quantiferon gold TB test for school. Controlled Substances Monitoring:     RX Monitoring 5/1/2019   Attestation The Prescription Monitoring Report for this patient was reviewed today. Chronic Pain Routine Monitoring No signs of potential drug abuse or diversion identified: otherwise, see note documentation;Possible medication side effects, risk of tolerance/dependence & alternative treatments discussed. Follow up in 3 months or sooner if needed.     Electronically

## 2019-05-02 LAB
ESTIMATED AVERAGE GLUCOSE: 116.9 MG/DL
HBA1C MFR BLD: 5.7 %

## 2019-05-03 ENCOUNTER — TELEPHONE (OUTPATIENT)
Dept: ENT CLINIC | Age: 59
End: 2019-05-03

## 2019-05-03 LAB
QUANTI TB GOLD PLUS: NEGATIVE
QUANTI TB1 MINUS NIL: 0 IU/ML (ref 0–0.34)
QUANTI TB2 MINUS NIL: 0.04 IU/ML (ref 0–0.34)
QUANTIFERON MITOGEN: >10 IU/ML
QUANTIFERON NIL: 0.07 IU/ML

## 2019-05-03 NOTE — TELEPHONE ENCOUNTER
LMOM that surgeries are canceled for next week and to call the office to let us know she received the message.

## 2019-05-15 ENCOUNTER — HOSPITAL ENCOUNTER (OUTPATIENT)
Age: 59
Setting detail: OUTPATIENT SURGERY
Discharge: HOME | End: 2019-05-15
Attending: OTOLARYNGOLOGY | Admitting: OTOLARYNGOLOGY
Payer: OTHER GOVERNMENT

## 2019-05-15 ENCOUNTER — ANESTHESIA (OUTPATIENT)
Dept: OPERATING ROOM | Age: 59
End: 2019-05-15
Payer: OTHER GOVERNMENT

## 2019-05-15 ENCOUNTER — ANESTHESIA EVENT (OUTPATIENT)
Dept: OPERATING ROOM | Age: 59
End: 2019-05-15
Payer: OTHER GOVERNMENT

## 2019-05-15 ENCOUNTER — TELEPHONE (OUTPATIENT)
Dept: ENT CLINIC | Age: 59
End: 2019-05-15

## 2019-05-15 VITALS
DIASTOLIC BLOOD PRESSURE: 71 MMHG | SYSTOLIC BLOOD PRESSURE: 133 MMHG | OXYGEN SATURATION: 94 % | TEMPERATURE: 98.1 F | RESPIRATION RATE: 32 BRPM

## 2019-05-15 VITALS
HEIGHT: 62 IN | SYSTOLIC BLOOD PRESSURE: 117 MMHG | OXYGEN SATURATION: 96 % | WEIGHT: 168 LBS | BODY MASS INDEX: 30.91 KG/M2 | TEMPERATURE: 98 F | HEART RATE: 89 BPM | DIASTOLIC BLOOD PRESSURE: 68 MMHG | RESPIRATION RATE: 16 BRPM

## 2019-05-15 DIAGNOSIS — H72.91 CHRONIC OTITIS MEDIA OF RIGHT EAR WITH POSTERIOR PERFORATION: Primary | ICD-10-CM

## 2019-05-15 DIAGNOSIS — H66.91 CHRONIC OTITIS MEDIA OF RIGHT EAR WITH POSTERIOR PERFORATION: Primary | ICD-10-CM

## 2019-05-15 LAB
GLUCOSE BLD-MCNC: 104 MG/DL (ref 70–99)
GLUCOSE BLD-MCNC: 82 MG/DL (ref 70–99)
PERFORMED ON: ABNORMAL
PERFORMED ON: NORMAL

## 2019-05-15 PROCEDURE — 69631 REPAIR EARDRUM STRUCTURES: CPT | Performed by: OTOLARYNGOLOGY

## 2019-05-15 PROCEDURE — 7100000000 HC PACU RECOVERY - FIRST 15 MIN: Performed by: OTOLARYNGOLOGY

## 2019-05-15 PROCEDURE — 2500000003 HC RX 250 WO HCPCS: Performed by: NURSE ANESTHETIST, CERTIFIED REGISTERED

## 2019-05-15 PROCEDURE — 7100000001 HC PACU RECOVERY - ADDTL 15 MIN: Performed by: OTOLARYNGOLOGY

## 2019-05-15 PROCEDURE — 2580000003 HC RX 258: Performed by: OTOLARYNGOLOGY

## 2019-05-15 PROCEDURE — 3600000014 HC SURGERY LEVEL 4 ADDTL 15MIN: Performed by: OTOLARYNGOLOGY

## 2019-05-15 PROCEDURE — 2500000003 HC RX 250 WO HCPCS: Performed by: OTOLARYNGOLOGY

## 2019-05-15 PROCEDURE — 3700000000 HC ANESTHESIA ATTENDED CARE: Performed by: OTOLARYNGOLOGY

## 2019-05-15 PROCEDURE — 6370000000 HC RX 637 (ALT 250 FOR IP): Performed by: OTOLARYNGOLOGY

## 2019-05-15 PROCEDURE — 6360000002 HC RX W HCPCS: Performed by: OTOLARYNGOLOGY

## 2019-05-15 PROCEDURE — 2580000003 HC RX 258: Performed by: NURSE ANESTHETIST, CERTIFIED REGISTERED

## 2019-05-15 PROCEDURE — 7100000011 HC PHASE II RECOVERY - ADDTL 15 MIN: Performed by: OTOLARYNGOLOGY

## 2019-05-15 PROCEDURE — 2709999900 HC NON-CHARGEABLE SUPPLY: Performed by: OTOLARYNGOLOGY

## 2019-05-15 PROCEDURE — 6360000002 HC RX W HCPCS: Performed by: NURSE ANESTHETIST, CERTIFIED REGISTERED

## 2019-05-15 PROCEDURE — 7100000010 HC PHASE II RECOVERY - FIRST 15 MIN: Performed by: OTOLARYNGOLOGY

## 2019-05-15 PROCEDURE — 3600000004 HC SURGERY LEVEL 4 BASE: Performed by: OTOLARYNGOLOGY

## 2019-05-15 PROCEDURE — 2580000003 HC RX 258: Performed by: FAMILY MEDICINE

## 2019-05-15 PROCEDURE — 3700000001 HC ADD 15 MINUTES (ANESTHESIA): Performed by: OTOLARYNGOLOGY

## 2019-05-15 RX ORDER — SODIUM CHLORIDE 0.9 % (FLUSH) 0.9 %
10 SYRINGE (ML) INJECTION PRN
Status: DISCONTINUED | OUTPATIENT
Start: 2019-05-15 | End: 2019-05-15 | Stop reason: HOSPADM

## 2019-05-15 RX ORDER — DEXAMETHASONE SODIUM PHOSPHATE 4 MG/ML
INJECTION, SOLUTION INTRA-ARTICULAR; INTRALESIONAL; INTRAMUSCULAR; INTRAVENOUS; SOFT TISSUE PRN
Status: DISCONTINUED | OUTPATIENT
Start: 2019-05-15 | End: 2019-05-15 | Stop reason: SDUPTHER

## 2019-05-15 RX ORDER — ONDANSETRON 2 MG/ML
4 INJECTION INTRAMUSCULAR; INTRAVENOUS EVERY 6 HOURS PRN
Status: DISCONTINUED | OUTPATIENT
Start: 2019-05-15 | End: 2019-05-15 | Stop reason: HOSPADM

## 2019-05-15 RX ORDER — SODIUM CHLORIDE 9 MG/ML
INJECTION, SOLUTION INTRAVENOUS CONTINUOUS
Status: DISCONTINUED | OUTPATIENT
Start: 2019-05-15 | End: 2019-05-15 | Stop reason: HOSPADM

## 2019-05-15 RX ORDER — SUCCINYLCHOLINE/SOD CL,ISO/PF 100 MG/5ML
SYRINGE (ML) INTRAVENOUS PRN
Status: DISCONTINUED | OUTPATIENT
Start: 2019-05-15 | End: 2019-05-15 | Stop reason: SDUPTHER

## 2019-05-15 RX ORDER — EPHEDRINE SULFATE 50 MG/ML
INJECTION INTRAVENOUS PRN
Status: DISCONTINUED | OUTPATIENT
Start: 2019-05-15 | End: 2019-05-15 | Stop reason: SDUPTHER

## 2019-05-15 RX ORDER — MEPERIDINE HYDROCHLORIDE 25 MG/ML
12.5 INJECTION INTRAMUSCULAR; INTRAVENOUS; SUBCUTANEOUS EVERY 5 MIN PRN
Status: DISCONTINUED | OUTPATIENT
Start: 2019-05-15 | End: 2019-05-15 | Stop reason: HOSPADM

## 2019-05-15 RX ORDER — PROMETHAZINE HYDROCHLORIDE 25 MG/ML
6.25 INJECTION, SOLUTION INTRAMUSCULAR; INTRAVENOUS PRN
Status: DISCONTINUED | OUTPATIENT
Start: 2019-05-15 | End: 2019-05-15 | Stop reason: HOSPADM

## 2019-05-15 RX ORDER — HYDROMORPHONE HCL 110MG/55ML
0.25 PATIENT CONTROLLED ANALGESIA SYRINGE INTRAVENOUS EVERY 5 MIN PRN
Status: DISCONTINUED | OUTPATIENT
Start: 2019-05-15 | End: 2019-05-15 | Stop reason: HOSPADM

## 2019-05-15 RX ORDER — EPINEPHRINE 1 MG/ML
INJECTION, SOLUTION, CONCENTRATE INTRAVENOUS
Status: COMPLETED | OUTPATIENT
Start: 2019-05-15 | End: 2019-05-15

## 2019-05-15 RX ORDER — ONDANSETRON 2 MG/ML
INJECTION INTRAMUSCULAR; INTRAVENOUS PRN
Status: DISCONTINUED | OUTPATIENT
Start: 2019-05-15 | End: 2019-05-15 | Stop reason: SDUPTHER

## 2019-05-15 RX ORDER — NEOMYCIN SULFATE, POLYMYXIN B SULFATE, HYDROCORTISONE 3.5; 10000; 1 MG/ML; [USP'U]/ML; MG/ML
SOLUTION/ DROPS AURICULAR (OTIC)
Status: COMPLETED | OUTPATIENT
Start: 2019-05-15 | End: 2019-05-15

## 2019-05-15 RX ORDER — CIPROFLOXACIN 500 MG/1
500 TABLET, FILM COATED ORAL 2 TIMES DAILY
Qty: 14 TABLET | Refills: 0 | Status: SHIPPED | OUTPATIENT
Start: 2019-05-15 | End: 2019-05-22

## 2019-05-15 RX ORDER — FENTANYL CITRATE 50 UG/ML
50 INJECTION, SOLUTION INTRAMUSCULAR; INTRAVENOUS EVERY 5 MIN PRN
Status: DISCONTINUED | OUTPATIENT
Start: 2019-05-15 | End: 2019-05-15 | Stop reason: HOSPADM

## 2019-05-15 RX ORDER — OXYCODONE HYDROCHLORIDE 5 MG/1
10 TABLET ORAL PRN
Status: DISCONTINUED | OUTPATIENT
Start: 2019-05-15 | End: 2019-05-15 | Stop reason: HOSPADM

## 2019-05-15 RX ORDER — PROPOFOL 10 MG/ML
INJECTION, EMULSION INTRAVENOUS PRN
Status: DISCONTINUED | OUTPATIENT
Start: 2019-05-15 | End: 2019-05-15 | Stop reason: SDUPTHER

## 2019-05-15 RX ORDER — LABETALOL HYDROCHLORIDE 5 MG/ML
5 INJECTION, SOLUTION INTRAVENOUS EVERY 10 MIN PRN
Status: DISCONTINUED | OUTPATIENT
Start: 2019-05-15 | End: 2019-05-15 | Stop reason: HOSPADM

## 2019-05-15 RX ORDER — CEFAZOLIN SODIUM 2 G/100ML
2 INJECTION, SOLUTION INTRAVENOUS ONCE
Status: COMPLETED | OUTPATIENT
Start: 2019-05-15 | End: 2019-05-15

## 2019-05-15 RX ORDER — DIPHENHYDRAMINE HYDROCHLORIDE 50 MG/ML
12.5 INJECTION INTRAMUSCULAR; INTRAVENOUS
Status: DISCONTINUED | OUTPATIENT
Start: 2019-05-15 | End: 2019-05-15 | Stop reason: HOSPADM

## 2019-05-15 RX ORDER — NYSTATIN 100000 U/G
OINTMENT TOPICAL
Status: COMPLETED | OUTPATIENT
Start: 2019-05-15 | End: 2019-05-15

## 2019-05-15 RX ORDER — SODIUM CHLORIDE 0.9 % (FLUSH) 0.9 %
10 SYRINGE (ML) INJECTION EVERY 12 HOURS SCHEDULED
Status: DISCONTINUED | OUTPATIENT
Start: 2019-05-15 | End: 2019-05-15 | Stop reason: HOSPADM

## 2019-05-15 RX ORDER — SODIUM CHLORIDE 9 MG/ML
INJECTION, SOLUTION INTRAVENOUS CONTINUOUS PRN
Status: DISCONTINUED | OUTPATIENT
Start: 2019-05-15 | End: 2019-05-15 | Stop reason: SDUPTHER

## 2019-05-15 RX ORDER — LIDOCAINE HYDROCHLORIDE 20 MG/ML
INJECTION, SOLUTION INFILTRATION; PERINEURAL PRN
Status: DISCONTINUED | OUTPATIENT
Start: 2019-05-15 | End: 2019-05-15 | Stop reason: SDUPTHER

## 2019-05-15 RX ORDER — ACETAMINOPHEN AND CODEINE PHOSPHATE 300; 30 MG/1; MG/1
1 TABLET ORAL EVERY 4 HOURS PRN
Qty: 20 TABLET | Refills: 0 | Status: SHIPPED | OUTPATIENT
Start: 2019-05-15 | End: 2019-05-22

## 2019-05-15 RX ORDER — MIDAZOLAM HYDROCHLORIDE 1 MG/ML
INJECTION INTRAMUSCULAR; INTRAVENOUS PRN
Status: DISCONTINUED | OUTPATIENT
Start: 2019-05-15 | End: 2019-05-15 | Stop reason: SDUPTHER

## 2019-05-15 RX ORDER — MAGNESIUM HYDROXIDE 1200 MG/15ML
LIQUID ORAL CONTINUOUS PRN
Status: COMPLETED | OUTPATIENT
Start: 2019-05-15 | End: 2019-05-15

## 2019-05-15 RX ORDER — LIDOCAINE HYDROCHLORIDE AND EPINEPHRINE 10; 10 MG/ML; UG/ML
INJECTION, SOLUTION INFILTRATION; PERINEURAL
Status: COMPLETED | OUTPATIENT
Start: 2019-05-15 | End: 2019-05-15

## 2019-05-15 RX ORDER — METHYLPREDNISOLONE 4 MG/1
4 TABLET ORAL SEE ADMIN INSTRUCTIONS
Qty: 1 KIT | Refills: 0 | Status: SHIPPED | OUTPATIENT
Start: 2019-05-15 | End: 2019-05-22

## 2019-05-15 RX ORDER — FENTANYL CITRATE 50 UG/ML
INJECTION, SOLUTION INTRAMUSCULAR; INTRAVENOUS PRN
Status: DISCONTINUED | OUTPATIENT
Start: 2019-05-15 | End: 2019-05-15 | Stop reason: SDUPTHER

## 2019-05-15 RX ORDER — OXYCODONE HYDROCHLORIDE 5 MG/1
5 TABLET ORAL PRN
Status: DISCONTINUED | OUTPATIENT
Start: 2019-05-15 | End: 2019-05-15 | Stop reason: HOSPADM

## 2019-05-15 RX ORDER — HYDROMORPHONE HCL 110MG/55ML
0.5 PATIENT CONTROLLED ANALGESIA SYRINGE INTRAVENOUS EVERY 5 MIN PRN
Status: DISCONTINUED | OUTPATIENT
Start: 2019-05-15 | End: 2019-05-15 | Stop reason: HOSPADM

## 2019-05-15 RX ADMIN — EPHEDRINE SULFATE 10 MG: 50 INJECTION, SOLUTION INTRAVENOUS at 07:39

## 2019-05-15 RX ADMIN — EPHEDRINE SULFATE 10 MG: 50 INJECTION, SOLUTION INTRAVENOUS at 07:34

## 2019-05-15 RX ADMIN — PROPOFOL 200 MG: 10 INJECTION, EMULSION INTRAVENOUS at 07:09

## 2019-05-15 RX ADMIN — PHENYLEPHRINE HYDROCHLORIDE 200 MCG: 10 INJECTION INTRAVENOUS at 07:38

## 2019-05-15 RX ADMIN — SODIUM CHLORIDE: 9 INJECTION, SOLUTION INTRAVENOUS at 06:39

## 2019-05-15 RX ADMIN — PHENYLEPHRINE HYDROCHLORIDE 200 MCG: 10 INJECTION INTRAVENOUS at 07:21

## 2019-05-15 RX ADMIN — SODIUM CHLORIDE: 9 INJECTION, SOLUTION INTRAVENOUS at 07:00

## 2019-05-15 RX ADMIN — EPHEDRINE SULFATE 10 MG: 50 INJECTION, SOLUTION INTRAVENOUS at 07:32

## 2019-05-15 RX ADMIN — FENTANYL CITRATE 50 MCG: 50 INJECTION, SOLUTION INTRAMUSCULAR; INTRAVENOUS at 07:09

## 2019-05-15 RX ADMIN — PHENYLEPHRINE HYDROCHLORIDE 200 MCG: 10 INJECTION INTRAVENOUS at 07:35

## 2019-05-15 RX ADMIN — MIDAZOLAM HYDROCHLORIDE 2 MG: 1 INJECTION, SOLUTION INTRAMUSCULAR; INTRAVENOUS at 07:00

## 2019-05-15 RX ADMIN — Medication 120 MG: at 07:09

## 2019-05-15 RX ADMIN — FAMOTIDINE 20 MG: 10 INJECTION INTRAVENOUS at 06:55

## 2019-05-15 RX ADMIN — EPHEDRINE SULFATE 10 MG: 50 INJECTION, SOLUTION INTRAVENOUS at 07:43

## 2019-05-15 RX ADMIN — DEXAMETHASONE SODIUM PHOSPHATE 8 MG: 4 INJECTION, SOLUTION INTRAMUSCULAR; INTRAVENOUS at 07:18

## 2019-05-15 RX ADMIN — ONDANSETRON 4 MG: 2 INJECTION INTRAMUSCULAR; INTRAVENOUS at 07:09

## 2019-05-15 RX ADMIN — LIDOCAINE HYDROCHLORIDE 100 MG: 20 INJECTION, SOLUTION INFILTRATION; PERINEURAL at 07:09

## 2019-05-15 RX ADMIN — CEFAZOLIN SODIUM 2 G: 2 INJECTION, SOLUTION INTRAVENOUS at 06:59

## 2019-05-15 RX ADMIN — PHENYLEPHRINE HYDROCHLORIDE 100 MCG: 10 INJECTION INTRAVENOUS at 07:18

## 2019-05-15 ASSESSMENT — PULMONARY FUNCTION TESTS
PIF_VALUE: 19
PIF_VALUE: 1
PIF_VALUE: 19
PIF_VALUE: 18
PIF_VALUE: 3
PIF_VALUE: 19
PIF_VALUE: 19
PIF_VALUE: 20
PIF_VALUE: 18
PIF_VALUE: 19
PIF_VALUE: 2
PIF_VALUE: 18
PIF_VALUE: 19
PIF_VALUE: 19
PIF_VALUE: 1
PIF_VALUE: 4
PIF_VALUE: 19
PIF_VALUE: 1
PIF_VALUE: 19
PIF_VALUE: 19
PIF_VALUE: 22
PIF_VALUE: 19
PIF_VALUE: 54
PIF_VALUE: 2
PIF_VALUE: 4
PIF_VALUE: 19
PIF_VALUE: 0
PIF_VALUE: 19
PIF_VALUE: 17
PIF_VALUE: 1
PIF_VALUE: 0
PIF_VALUE: 20
PIF_VALUE: 0
PIF_VALUE: 19
PIF_VALUE: 19
PIF_VALUE: 5
PIF_VALUE: 19
PIF_VALUE: 1
PIF_VALUE: 20
PIF_VALUE: 19
PIF_VALUE: 20

## 2019-05-15 ASSESSMENT — PAIN - FUNCTIONAL ASSESSMENT: PAIN_FUNCTIONAL_ASSESSMENT: 0-10

## 2019-05-15 NOTE — OP NOTE
[address]                     [facility], [address] [place] [address]                                OPERATIVE REPORT    PATIENT NAME: Alfredo Hidalgo                    :        1960  MED REC NO:   [id number]                          ROOM:  ACCOUNT NO:   [de-identified]                           ADMIT DATE: 05/15/2019  PROVIDER:     Wayne Mobley    DATE OF PROCEDURE:  05/15/2019    PREOPERATIVE DIAGNOSIS:  Right chronic suppurative otitis media,  inactive. POSTOPERATIVE DIAGNOSIS:  Right chronic suppurative otitis media,  inactive. OPERATION PERFORMED:  Right exploratory tympanotomy, tympanoplasty type  1 fascia graft with graft taken extraorally. SURGEON:  Wayne Mobley MD.    ANESTHESIA:  General with endotracheal tube. OPERATIVE PROCEDURE:  The adequately premedicated patient was brought to  the operative room and placed in supine position. Anesthesia was  induced to satisfactory level with endotracheal tube in position. The  patient was draped in usual fashion. The patient has a history of  perforation in the right tympanic membrane posteriorly, measuring  approximately 30% of the tympanic membrane. She has coexisting severe  sensory neural loss and will continue to require hearing aids, but it is  desired of closing the perforation on the right side due to recurring  infections. The ear was injected with 1% Xylocaine with epinephrine  1:100,000 in a routine manner as well as the area posterior and slightly  anterior to the ear for removal of graft with approximately 6 mL  utilized. The area was then prepped and draped in usual fashion. The  ear was observed through the microscope. The 30% perforation  posteriorly and inferiorly was noted and the margins were clean with  thickened cup forceps. A cotton ball impregnated with epinephrine was  applied.   A small incision was made above the ear and a piece of  temporalis fascia was harvested and placed a two tongue bites for future  use. The ear was then once again observed and the speculum hold brought  into position. The microscope was utilized. The packing was removed. A tympanomeatal flap incision was made from approximately 9 o'clock to 5  o'clock approximately 6 mm from the annulus. The elevation was  performed. The annulus was reached, the annulus was lifted, and the  middle ear space was entered. There was no evidence of any  cholesteatoma or active infection noted. The ossicular chain appeared  to be intact. Irrigation was performed. At this point, Gelfoam-damped  with Cortisporin suspension were placed in the middle ear. The graft  was placed underneath the remaining portion of the flap and brought up  the posterior canal wall. The flap was then to use back in the position  and the area was then packed with Gelfoam-damped with Cortisporin  suspension. The remainder of the ear was packed with plain gauze,  impregnated with Mycolog ointment. Cotton ball was inserted. The small  incision above the ear was closed with three separate 4-0 nylon sutures. The patient tolerated the procedure well and was sent to the recovery  room in good condition.         Yaakov Morin    D: 05/15/2019 8:11:38       T: 05/15/2019 8:43:55     [id number]  Job#: [id number]     Doc#: [id number]    CC:

## 2019-05-15 NOTE — TELEPHONE ENCOUNTER
It can be normal.  She could take to the pain pills if she needs to every 4 hours that will be okay.

## 2019-05-15 NOTE — ANESTHESIA POSTPROCEDURE EVALUATION
Department of Anesthesiology  Postprocedure Note    Patient: Leonora Parks  MRN: [id number]  YOB: 1960  Date of evaluation: 5/15/2019  Time:  12:09 PM     Procedure Summary     Date:  05/15/19 Room / Location:  [facility] OR  / [facility] OR    Anesthesia Start:  0700 Anesthesia Stop:  2790    Procedure:  RIGHT TYMPANOPLASTY; REGIONAL BLOCK (Right Ear) Diagnosis:  (H66.91, H72.91  CHRONIC OTITIS MEDIA OF RIGHT EAR WITH POSTERIOR PERFORATION)    Surgeon:  Zane Brewer MD Responsible Provider:  Dali Thompson MD    Anesthesia Type:  general ASA Status:  2          Anesthesia Type: general    Francisco Phase I: Francisco Score: 10    Francisco Phase II: Francisco Score: 10    Last vitals: Reviewed and per EMR flowsheets.        Anesthesia Post Evaluation    Level of consciousness: awake  Complications: no

## 2019-05-15 NOTE — TELEPHONE ENCOUNTER
[phone] (home)   LMOM, pain is normal and PT may take the pain meds q4 hrs PRN.   Instructions to call office in the AM with any further issues

## 2019-05-15 NOTE — TELEPHONE ENCOUNTER
Pt had surgery today at 7am, since she got home she took 1 pain pill at 1030, 1230, 330 . Tylenol 3  She wants to know if its is ok having a lot of pain.   Also her jaw is killing her, is that normal?

## 2019-05-15 NOTE — PROGRESS NOTES
Received from or - drowsy,simple mask,right ear with cotton dry and intact,semi fowlers,frandy santana,scda,vss.

## 2019-05-15 NOTE — BRIEF OP NOTE
Brief Postoperative Note  ______________________________________________________________    Patient: Nilton Larry  YOB: 1960  MRN: [id number]  Date of Procedure: 5/15/2019    Pre-Op Diagnosis: H66.91, H72.91  CHRONIC OTITIS MEDIA OF RIGHT EAR WITH POSTERIOR PERFORATION    Post-Op Diagnosis: Same       Procedure(s):  RIGHT TYMPANOPLASTY; REGIONAL BLOCK    Anesthesia: General    Surgeon(s):  Leon Arnett MD    Assistant: none    Estimated Blood Loss (mL): 5 ml.     Complications: none    Specimens:   none    Implants:  none      Drains: none    Findings: as above    Marquis Bey MD  Date: 5/15/2019  Time: 7:58 AM

## 2019-05-15 NOTE — PROGRESS NOTES
Teaching/ education completed for home care including pain management, wound care,activity,safety precautions and infection control. Patient and spouse verbalized understanding.

## 2019-05-15 NOTE — ANESTHESIA PRE PROCEDURE
not apply route daily And as needed. 3/26/19   Adriana Horne MD   SUMAtriptan [facility]) 20 MG/ACT nasal spray 1 spray by Nasal route daily as needed for Migraine 11/30/18 11/30/19  Adriana Horne MD   fluticasone [facility]) 50 MCG/ACT nasal spray 2 sprays by Each Nare route daily as needed     Historical Provider, MD       Current medications:    Current Facility-Administered Medications   Medication Dose Route Frequency Provider Last Rate Last Dose    HYDROmorphone (DILAUDID) injection 0.25 mg  0.25 mg Intravenous Q5 Min PRN Cayden Guillen MD        fentaNYL (SUBLIMAZE) injection 50 mcg  50 mcg Intravenous Q5 Min PRN Cayden Guillen MD        HYDROmorphone (DILAUDID) injection 0.25 mg  0.25 mg Intravenous Q5 Min PRN Cayden Guillen MD        HYDROmorphone (DILAUDID) injection 0.5 mg  0.5 mg Intravenous Q5 Min PRN Cayden Guillen MD        oxyCODONE (ROXICODONE) immediate release tablet 5 mg  5 mg Oral PRN Cayden Guillen MD        Or    oxyCODONE (ROXICODONE) immediate release tablet 10 mg  10 mg Oral PRN Cayden Guillen MD        diphenhydrAMINE (BENADRYL) injection 12.5 mg  12.5 mg Intravenous Once PRN Cayden Guillen MD        promethazine (PHENERGAN) injection 6.25 mg  6.25 mg Intravenous PRN Cayden Guillen MD        labetalol (NORMODYNE;TRANDATE) injection 5 mg  5 mg Intravenous Q10 Min PRN Cayden Guillen MD        meperidine (DEMEROL) injection 12.5 mg  12.5 mg Intravenous Q5 Min PRN Cayden Guillen MD           Allergies:     Allergies   Allergen Reactions    Metformin And Related      Loose stool       Problem List:    Patient Active Problem List   Diagnosis Code    Type 2 diabetes mellitus without complication, without long-term current use of insulin (HCC) E11.9    HTN (hypertension) I10    Hyperlipidemia E78.5    GERD (gastroesophageal reflux disease) K21.9    Depression F32.9    Fatty liver disease, nonalcoholic G03.0    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[facility] [address]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    ADD (attention deficit disorder) F98.8    Dyslipidemia E78.5    DAMARIS (generalized anxiety disorder) F41.1    Chronic otitis media of right ear with posterior perforation H66.91, H72.91    Vestibulitis of ear, right H83.01       Past Medical History:        Diagnosis Date    Depression     GERD (gastroesophageal reflux disease)     Hyperlipidemia     Hypertension     Type II or unspecified type diabetes mellitus without mention of complication, not stated as uncontrolled        Past Surgical History:        Procedure Laterality Date     SECTION  1984+    CHOLECYSTECTOMY  2010    COLONOSCOPY  2018    bernal    EYE SURGERY  2008    Cataract     EYE SURGERY      lens replacement     HYSTERECTOMY  2007    partial    UPPER GASTROINTESTINAL ENDOSCOPY      with dilitation       Social History:    Social History     Tobacco Use    Smoking status: Former Smoker     Packs/day: 1.00     Years: 35.00     Pack years: 35.00     Last attempt to quit: 2012     Years since quittin.8    Smokeless tobacco: Never Used   Substance Use Topics    Alcohol use:  Yes     Alcohol/week: 1.0 oz     Types: 2 Standard drinks or equivalent per week     Comment: socially                                 Counseling given: Not Answered      Vital Signs (Current):   Vitals:    05/15/19 0600 05/15/19 0616   BP:  128/83   Pulse:  70   Resp:  16   Temp:  96.8 °F (36 °C)   TempSrc:  Temporal   SpO2:  98%   Weight: 168 lb (76.2 kg)    Height: 5' 2\" (1.575 m)                                               BP Readings from Last 3 Encounters:   05/15/19 128/83   19 120/74   19 124/62       NPO Status: Time of last liquid consumption: 0000                        Time of last solid consumption: 0000                        Date of last liquid consumption: 05/15/19                        Date of last solid food consumption: 05/15/19    BMI:   Wt Readings from Last 3 Encounters:   05/15/19 168 lb (76.2 kg)   19 168 lb 3.2 oz (76.3 kg)   01/31/19 179 lb (81.2 kg)     Body mass index is 30.73 kg/m². CBC:   Lab Results   Component Value Date    WBC 10.7 09/14/2015    RBC 4.78 09/14/2015    HGB 13.5 09/14/2015    HCT 41.8 09/14/2015    MCV 87.5 09/14/2015    RDW 14.2 09/14/2015     09/14/2015       CMP:   Lab Results   Component Value Date     05/01/2019    K 4.5 05/01/2019     05/01/2019    CO2 24 05/01/2019    BUN 17 05/01/2019    CREATININE 0.9 05/01/2019    GFRAA >60 05/01/2019    AGRATIO 1.3 05/01/2019    LABGLOM >60 05/01/2019    GLUCOSE 96 05/01/2019    PROT 7.2 05/01/2019    CALCIUM 10.0 05/01/2019    BILITOT 0.5 05/01/2019    ALKPHOS 137 05/01/2019    AST 16 05/01/2019    ALT 20 05/01/2019       POC Tests: No results for input(s): POCGLU, POCNA, POCK, POCCL, POCBUN, POCHEMO, POCHCT in the last 72 hours. Coags: No results found for: PROTIME, INR, APTT    HCG (If Applicable): No results found for: PREGTESTUR, PREGSERUM, HCG, HCGQUANT     ABGs: No results found for: PHART, PO2ART, JKO9AGF, QUT5VKV, BEART, L5KUNVIZ     Type & Screen (If Applicable):  No results found for: LABABO, LABRH    Anesthesia Evaluation    Airway: Mallampati: II  TM distance: >3 FB   Neck ROM: full  Mouth opening: > = 3 FB Dental:          Pulmonary:                              Cardiovascular:    (+) hypertension:,         Rhythm: regular  Rate: normal                    Neuro/Psych:               GI/Hepatic/Renal:   (+) GERD:, liver disease:,           Endo/Other:    (+) Diabetes, . Abdominal:           Vascular:                                        Anesthesia Plan      general     ASA 2       Induction: intravenous. Anesthetic plan and risks discussed with patient. Plan discussed with CRNA.                   Dontae Merino MD   5/15/2019

## 2019-05-22 ENCOUNTER — OFFICE VISIT (OUTPATIENT)
Dept: ENT CLINIC | Age: 59
End: 2019-05-22

## 2019-05-22 VITALS — DIASTOLIC BLOOD PRESSURE: 64 MMHG | SYSTOLIC BLOOD PRESSURE: 122 MMHG | HEART RATE: 75 BPM | OXYGEN SATURATION: 96 %

## 2019-05-22 DIAGNOSIS — H72.91 CHRONIC OTITIS MEDIA OF RIGHT EAR WITH POSTERIOR PERFORATION: Primary | ICD-10-CM

## 2019-05-22 DIAGNOSIS — H66.91 CHRONIC OTITIS MEDIA OF RIGHT EAR WITH POSTERIOR PERFORATION: Primary | ICD-10-CM

## 2019-05-22 PROCEDURE — 99024 POSTOP FOLLOW-UP VISIT: CPT | Performed by: OTOLARYNGOLOGY

## 2019-06-21 ENCOUNTER — OFFICE VISIT (OUTPATIENT)
Dept: ENT CLINIC | Age: 59
End: 2019-06-21

## 2019-06-21 VITALS — HEART RATE: 94 BPM | DIASTOLIC BLOOD PRESSURE: 80 MMHG | SYSTOLIC BLOOD PRESSURE: 112 MMHG | OXYGEN SATURATION: 96 %

## 2019-06-21 DIAGNOSIS — H72.91 CHRONIC OTITIS MEDIA OF RIGHT EAR WITH POSTERIOR PERFORATION: Primary | ICD-10-CM

## 2019-06-21 DIAGNOSIS — H66.91 CHRONIC OTITIS MEDIA OF RIGHT EAR WITH POSTERIOR PERFORATION: Primary | ICD-10-CM

## 2019-06-21 PROCEDURE — 99024 POSTOP FOLLOW-UP VISIT: CPT | Performed by: OTOLARYNGOLOGY

## 2019-06-21 NOTE — PROGRESS NOTES
Patient continues to do well following tympanoplasty on the right side with no drainage or pain. Her hearing is gradually improving. Currently, healing looks complete and we are now waiting for the completion of the disc dissolution of the dissolvable packing on the inside. We will have her return in 2 months for repeat audiogram.  She may get water in her ear and can stop drops.

## 2019-07-10 ENCOUNTER — TELEPHONE (OUTPATIENT)
Dept: INTERNAL MEDICINE CLINIC | Age: 59
End: 2019-07-10

## 2019-09-19 ENCOUNTER — HOSPITAL ENCOUNTER (OUTPATIENT)
Dept: WOMENS IMAGING | Age: 59
Discharge: HOME | End: 2019-09-19
Payer: OTHER GOVERNMENT

## 2019-09-19 DIAGNOSIS — Z12.31 VISIT FOR SCREENING MAMMOGRAM: ICD-10-CM

## 2019-09-19 PROCEDURE — 77067 SCR MAMMO BI INCL CAD: CPT

## 2019-10-11 ENCOUNTER — TELEPHONE (OUTPATIENT)
Dept: INTERNAL MEDICINE CLINIC | Age: 59
End: 2019-10-11

## 2019-10-11 NOTE — TELEPHONE ENCOUNTER
Pt was in today, would like to say thank Dr. Balbina Garcia and Fabiana Jaramillo for being there for the last 5 years. She said she has completed her masters and CPE and is now at [address] [place], [place] [address]Th [facility] as a !

## 2020-07-08 ENCOUNTER — TELEPHONE (OUTPATIENT)
Dept: INTERNAL MEDICINE CLINIC | Age: 60
End: 2020-07-08

## 2020-07-08 NOTE — TELEPHONE ENCOUNTER
Johnathan Bustamante at [facility] calling regarding patient. Patient needs outpatient radiation therapy. She has [employer]. Johnathan Bustamante tried to submit the prior auth through the portal but it will not allow her and advised her the Welton Krabbe has to come from PCP. Attending at [facility] will be Dr Talat Lucas. Patient will have 30 units. Dx code: C71.9. Procedure codes: 05428  96025  64574  78333  24865                                54059  [id number]  85566  12583  Suha asked for a call back when authorized so she can get patient scheduled.

## 2020-07-10 ENCOUNTER — TELEPHONE (OUTPATIENT)
Dept: INTERNAL MEDICINE CLINIC | Age: 60
End: 2020-07-10

## 2020-07-13 ENCOUNTER — TELEPHONE (OUTPATIENT)
Dept: INTERNAL MEDICINE CLINIC | Age: 60
End: 2020-07-13

## 2020-07-13 NOTE — TELEPHONE ENCOUNTER
Igor Vazquez from [facility]called requesting to speak with you concerning getting pt scheduled.   She states she has spoken with you in the past.

## 2020-07-15 ENCOUNTER — HOSPITAL ENCOUNTER (EMERGENCY)
Age: 60
Discharge: ANOTHER HOSPITAL | End: 2020-07-15
Attending: EMERGENCY MEDICINE
Payer: OTHER GOVERNMENT

## 2020-07-15 ENCOUNTER — APPOINTMENT (OUTPATIENT)
Dept: CT IMAGING | Age: 60
End: 2020-07-15
Payer: OTHER GOVERNMENT

## 2020-07-15 VITALS
HEART RATE: 118 BPM | SYSTOLIC BLOOD PRESSURE: 178 MMHG | TEMPERATURE: 98.7 F | WEIGHT: 172.18 LBS | HEIGHT: 62 IN | OXYGEN SATURATION: 96 % | BODY MASS INDEX: 31.68 KG/M2 | DIASTOLIC BLOOD PRESSURE: 96 MMHG | RESPIRATION RATE: 31 BRPM

## 2020-07-15 LAB
ANION GAP SERPL CALCULATED.3IONS-SCNC: 17 MMOL/L (ref 3–16)
BASOPHILS ABSOLUTE: 0.1 K/UL (ref 0–0.2)
BASOPHILS RELATIVE PERCENT: 0.5 %
BILIRUBIN URINE: NEGATIVE
BLOOD, URINE: NEGATIVE
BUN BLDV-MCNC: 20 MG/DL (ref 7–20)
CALCIUM SERPL-MCNC: 10.4 MG/DL (ref 8.3–10.6)
CHLORIDE BLD-SCNC: 102 MMOL/L (ref 99–110)
CHP ED QC CHECK: NORMAL
CLARITY: ABNORMAL
CO2: 18 MMOL/L (ref 21–32)
COLOR: ABNORMAL
CREAT SERPL-MCNC: 0.9 MG/DL (ref 0.6–1.1)
CRYSTALS, UA: ABNORMAL /HPF
EOSINOPHILS ABSOLUTE: 0.1 K/UL (ref 0–0.6)
EOSINOPHILS RELATIVE PERCENT: 0.8 %
EPITHELIAL CELLS, UA: 1 /HPF (ref 0–5)
GFR AFRICAN AMERICAN: >60
GFR NON-AFRICAN AMERICAN: >60
GLUCOSE BLD-MCNC: 188 MG/DL (ref 70–99)
GLUCOSE BLD-MCNC: 191 MG/DL
GLUCOSE BLD-MCNC: 191 MG/DL (ref 70–99)
GLUCOSE URINE: 250 MG/DL
HCT VFR BLD CALC: 46.1 % (ref 36–48)
HEMOGLOBIN: 14.7 G/DL (ref 12–16)
HYALINE CASTS: 6 /LPF (ref 0–8)
KETONES, URINE: NEGATIVE MG/DL
LEUKOCYTE ESTERASE, URINE: NEGATIVE
LYMPHOCYTES ABSOLUTE: 2.9 K/UL (ref 1–5.1)
LYMPHOCYTES RELATIVE PERCENT: 16.8 %
MCH RBC QN AUTO: 28.7 PG (ref 26–34)
MCHC RBC AUTO-ENTMCNC: 31.9 G/DL (ref 31–36)
MCV RBC AUTO: 89.8 FL (ref 80–100)
MICROSCOPIC EXAMINATION: YES
MONOCYTES ABSOLUTE: 1.3 K/UL (ref 0–1.3)
MONOCYTES RELATIVE PERCENT: 7.4 %
NEUTROPHILS ABSOLUTE: 12.6 K/UL (ref 1.7–7.7)
NEUTROPHILS RELATIVE PERCENT: 74.5 %
NITRITE, URINE: NEGATIVE
PDW BLD-RTO: 14.8 % (ref 12.4–15.4)
PERFORMED ON: ABNORMAL
PH UA: 5 (ref 5–8)
PLATELET # BLD: 193 K/UL (ref 135–450)
PMV BLD AUTO: 8.8 FL (ref 5–10.5)
POTASSIUM REFLEX MAGNESIUM: 4 MMOL/L (ref 3.5–5.1)
PROTEIN UA: 100 MG/DL
RBC # BLD: 5.14 M/UL (ref 4–5.2)
RBC UA: 3 /HPF (ref 0–4)
SODIUM BLD-SCNC: 137 MMOL/L (ref 136–145)
SPECIFIC GRAVITY UA: 1.02 (ref 1–1.03)
TROPONIN: <0.01 NG/ML
URINE REFLEX TO CULTURE: ABNORMAL
URINE TYPE: ABNORMAL
UROBILINOGEN, URINE: 0.2 E.U./DL
WBC # BLD: 17 K/UL (ref 4–11)
WBC UA: 1 /HPF (ref 0–5)

## 2020-07-15 PROCEDURE — 96375 TX/PRO/DX INJ NEW DRUG ADDON: CPT

## 2020-07-15 PROCEDURE — 96365 THER/PROPH/DIAG IV INF INIT: CPT

## 2020-07-15 PROCEDURE — 80048 BASIC METABOLIC PNL TOTAL CA: CPT

## 2020-07-15 PROCEDURE — 99291 CRITICAL CARE FIRST HOUR: CPT

## 2020-07-15 PROCEDURE — 81001 URINALYSIS AUTO W/SCOPE: CPT

## 2020-07-15 PROCEDURE — 6360000002 HC RX W HCPCS: Performed by: EMERGENCY MEDICINE

## 2020-07-15 PROCEDURE — 84484 ASSAY OF TROPONIN QUANT: CPT

## 2020-07-15 PROCEDURE — 85025 COMPLETE CBC W/AUTO DIFF WBC: CPT

## 2020-07-15 PROCEDURE — 70450 CT HEAD/BRAIN W/O DYE: CPT

## 2020-07-15 PROCEDURE — 6360000002 HC RX W HCPCS

## 2020-07-15 RX ORDER — LEVETIRACETAM 10 MG/ML
1000 INJECTION INTRAVASCULAR ONCE
Status: COMPLETED | OUTPATIENT
Start: 2020-07-15 | End: 2020-07-15

## 2020-07-15 RX ORDER — LORAZEPAM 2 MG/ML
INJECTION INTRAMUSCULAR
Status: COMPLETED
Start: 2020-07-15 | End: 2020-07-15

## 2020-07-15 RX ORDER — LORAZEPAM 2 MG/ML
2 INJECTION INTRAMUSCULAR ONCE
Status: COMPLETED | OUTPATIENT
Start: 2020-07-15 | End: 2020-07-15

## 2020-07-15 RX ADMIN — LORAZEPAM 2 MG: 2 INJECTION INTRAMUSCULAR at 19:47

## 2020-07-15 RX ADMIN — LEVETIRACETAM 1000 MG: 10 INJECTION INTRAVENOUS at 20:00

## 2020-07-15 RX ADMIN — LORAZEPAM 2 MG: 2 INJECTION INTRAMUSCULAR; INTRAVENOUS at 19:47

## 2020-07-15 NOTE — ED NOTES
Patient started seizing @ 1941  Given 1mg IV ativan @ 1943    Still seizing @ 1947  Given another 1mg IV ativan @ 1947    MD remains at bedside  Seizure pads in place    RT called     Bello Montes RN  07/15/20 1948

## 2020-07-16 NOTE — ED PROVIDER NOTES
[facility]  eMERGENCY dEPARTMENT eNCOUnter      Pt Name: Sudarshan Saldana  MRN: [id number]  [place] 1960  Date of evaluation: 7/15/2020  Provider: Selene Alexander MD    CHIEF COMPLAINT       Chief Complaint   Patient presents with    Seizures     patient arrived in private care to EMS doors by  unresposnive following a seizure         HISTORY OF PRESENT ILLNESS   (Location/Symptom, Timing/Onset,Context/Setting, Quality, Duration, Modifying Factors, Severity)  Note limiting factors. Sudarshan Saldana is a 61 y.o. female who presents to the emergency department with new onset seizure. This patient has a past medical history of depression, reflux, hyperlipidemia, hypertension, type 2 diabetes mellitus, and a recent diagnosis of glioblastoma. The patient sees neurosurgery at [facility] and is also being seen by hematology at [facility]. She is had no prior episodes of seizures. The patient's  is a primary history provider. He states earlier today noticed that the patient's left hand started to shake and then within a short period of time she had generalized tonic-clonic seizure that lasted anywhere from 2 to 5 minutes. After that she was very somnolent and did not return to her normal mental status baseline. He was trying to get her to [facility] but diverted to our emergency department. Patient arrived in our emergency department and appeared postictal.  She was sleepy. When I called her name she would open her eyes and smile at me. When I asked her if her name was Valentino Mann she nodded her head yes. Shortly after arrival the patient had another generalized tonic-clonic seizure. She received a total of 2 mg of Ativan IV before her seizure stopped. She has had no further seizure activity. She has not returned to her normal mental status as of yet. HPI    NursingNotes were reviewed.     REVIEW OF SYSTEMS    (2-9 systems for level 4, 10 or more for level 5)     Review of Systems   Unable to perform ROS: Mental status change       Except as noted above the remainder of the review of systems was reviewed and negative.        PAST MEDICAL HISTORY     Past Medical History:   Diagnosis Date    Depression     GERD (gastroesophageal reflux disease)     Hyperlipidemia     Hypertension     Type II or unspecified type diabetes mellitus without mention of complication, not stated as uncontrolled          SURGICALHISTORY       Past Surgical History:   Procedure Laterality Date     SECTION  1984+    CHOLECYSTECTOMY  2010    COLONOSCOPY  2018    bernal    EYE SURGERY  2008    Cataract     EYE SURGERY      lens replacement     HYSTERECTOMY  2007    partial    TYMPANOPLASTY Right 5/15/2019    RIGHT TYMPANOPLASTY; REGIONAL BLOCK performed by Bishop Radames MD at [address] [place] [address]      with dilitation         CURRENT MEDICATIONS       Discharge Medication List as of 7/15/2020 11:02 PM      CONTINUE these medications which have NOT CHANGED    Details   traZODone (DESYREL) 50 MG tablet Take 2.5 tablets by mouth nightly as needed for Sleep, Disp-225 tablet, R-1Normal      vitamin B-12 (CYANOCOBALAMIN) 1000 MCG tablet Take 1,000 mcg by mouth dailyHistorical Med      vitamin B-6 (PYRIDOXINE) 100 MG tablet Take 100 mg by mouth dailyHistorical Med      Potassium 99 MG TABS Take by mouth dailyHistorical Med      Chromium 1000 MCG TABS Take by mouth dailyHistorical Med      magnesium (MAGNESIUM-OXIDE) 250 MG TABS tablet Take 250 mg by mouth dailyHistorical Med      olmesartan (BENICAR) 20 MG tablet Take 1 tablet by mouth daily, Disp-90 tablet, R-3Normal      simvastatin (ZOCOR) 20 MG tablet Take 1 tablet by mouth nightly, Disp-90 tablet, R-3Normal      Exenatide (BYDUREON) 2 MG PEN Inject 1 pen into the skin once a week, Disp-12 pen, R-3Normal      amphetamine-dextroamphetamine (ADDERALL XR) 30 MG extended release capsule Take 1 capsule by mouth every morning for 90 days. , Disp-90 capsule, R-0Normal      blood glucose monitor kit and supplies 1 kit by Other route daily, Other, DAILY Starting 2019, Disp-1 kit, R-0, Normal      blood glucose test strips (ASCENSIA AUTODISC VI;ONE TOUCH ULTRA TEST VI) strip DAILY Starting Tue 3/26/2019, Disp-100 each, R-3, NormalAnd as needed. Lancets MISC DAILY Starting Tue 3/26/2019, Disp-100 each, R-3, NormalAnd as needed. SUMAtriptan (IMITREX) 20 MG/ACT nasal spray 1 spray by Nasal route daily as needed for Migraine, Disp-3 Inhaler, R-3Normal      fluticasone (FLONASE) 50 MCG/ACT nasal spray 2 sprays by Each Nare route daily as needed Historical Med      omeprazole (PRILOSEC) 40 MG delayed release capsule Take 1 capsule by mouth daily, Disp-90 capsule, R-3Normal             ALLERGIES     Metformin and related    FAMILY HISTORY       Family History   Problem Relation Age of Onset    Cancer Mother           SOCIAL HISTORY       Social History     Socioeconomic History    Marital status:      Spouse name: None    Number of children: None    Years of education: None    Highest education level: None   Occupational History    None   Social Needs    Financial resource strain: None    Food insecurity     Worry: None     Inability: None    Transportation needs     Medical: None     Non-medical: None   Tobacco Use    Smoking status: Former Smoker     Packs/day: 1.00     Years: 35.00     Pack years: 35.00     Last attempt to quit: 2012     Years since quittin.0    Smokeless tobacco: Never Used   Substance and Sexual Activity    Alcohol use:  Yes     Alcohol/week: 1.7 standard drinks     Types: 2 Standard drinks or equivalent per week     Comment: socially     Drug use: No    Sexual activity: Yes     Partners: Male     Comment:     Lifestyle    Physical activity     Days per week: None     Minutes per session: None    Stress: None   Relationships    Social connections     Talks on phone: None     Gets together: None     Attends Orthodoxy service: None     Active member of club or organization: None     Attends meetings of clubs or organizations: None     Relationship status: None    Intimate partner violence     Fear of current or ex partner: None     Emotionally abused: None     Physically abused: None     Forced sexual activity: None   Other Topics Concern    None   Social History Narrative    None       SCREENINGS             PHYSICAL EXAM    (up to 7 for level 4, 8 or more for level 5)     ED Triage Vitals   BP Temp Temp src Pulse Resp SpO2 Height Weight   07/15/20 1949 -- -- 07/15/20 1946 07/15/20 1946 07/15/20 1946 -- 07/15/20 1946   108/61   141 18 96 %  172 lb 2.9 oz (78.1 kg)       Physical Exam  Vitals signs and nursing note reviewed. Constitutional:       Appearance: She is well-developed. She is not diaphoretic. Comments: Patient is sleepy. She has an intact gag response. HENT:      Head: Normocephalic and atraumatic. Comments: No ingram sign. No raccoon eyes. No blood or fluid from the ears or nose. Nose: Nose normal.      Mouth/Throat:      Comments: I do not see any bites on the patient's tongue but I had a short period of time before second seizure. She is had no blood from her mouth. Eyes:      General:         Right eye: No discharge. Left eye: No discharge. Conjunctiva/sclera: Conjunctivae normal.      Comments: Pupils equally reactive. No blown pupil. Neck:      Musculoskeletal: Neck supple. Cardiovascular:      Rate and Rhythm: Tachycardia present. Pulses: Normal pulses. Heart sounds: No murmur. Pulmonary:      Effort: Pulmonary effort is normal. No respiratory distress. Breath sounds: Normal breath sounds. No wheezing, rhonchi or rales. Chest:      Chest wall: No tenderness. Abdominal:      Palpations: Abdomen is soft.       Comments: Nondistended. No  Masses. Musculoskeletal: Normal range of motion. Skin:     General: Skin is warm and dry. Capillary Refill: Capillary refill takes less than 2 seconds. Neurological:      GCS: GCS eye subscore is 4. GCS verbal subscore is 1. GCS motor subscore is 5. Comments: On arrival the patient was somnolent but awake able. She had another generalized tonic-clonic seizure. She still remains somnolent but does have an intact gag reflex. GCS on arrival was 9. Eye 4, 1 verbal, 5 motor. Psychiatric:         Behavior: Behavior normal.         DIAGNOSTIC RESULTS     EKG: All EKG's are interpreted by the Emergency Department Physician who either signs or Co-signsthis chart in the absence of a cardiologist.        RADIOLOGY:   Osmin Jester such as CT, Ultrasound and MRI are read by the radiologist. Ana Ramirez radiographic images are visualized and preliminarily interpreted by the emergency physician with the below findings:        Interpretation per the Radiologist below, if available at the time ofthis note:    CT Head WO Contrast   Final Result   Possible intracranial masses, bilateral supratentorial, with vasogenic edema. MRI with gadolinium is recommended when feasible. No hemorrhage is identified.                ED BEDSIDE ULTRASOUND:   Performed by ED Physician - none    LABS:  Labs Reviewed   CBC WITH AUTO DIFFERENTIAL - Abnormal; Notable for the following components:       Result Value    WBC 17.0 (*)     Neutrophils Absolute 12.6 (*)     All other components within normal limits    Narrative:     Performed at:  [address] [address]   Phone [phone]   BASIC METABOLIC PANEL W/ REFLEX TO MG FOR LOW K - Abnormal; Notable for the following components:    CO2 18 (*)     Anion Gap 17 (*)     Glucose 188 (*)     All other components within normal limits    Narrative:     Performed at:  [facility]  [address] [facility] [employer] [address]   Phone [phone]   URINE RT REFLEX TO CULTURE - Abnormal; Notable for the following components:    Clarity, UA CLOUDY (*)     Glucose, Ur 250 (*)     Protein,  (*)     All other components within normal limits    Narrative:     Performed at:  [facility]  [address] [facility] [address] [address]   Phone [phone]   MICROSCOPIC URINALYSIS - Abnormal; Notable for the following components:    Crystals, UA 1+ Ca. Oxalate (*)     All other components within normal limits    Narrative:     Performed at:  [facility]  [address] [facility] [employer] [address]   Phone [phone]   POCT GLUCOSE - Abnormal; Notable for the following components:    POC Glucose 191 (*)     All other components within normal limits    Narrative:     Performed at:  [facility]  [address] [facility] [employer] [address]   Phone [phone]   POCT GLUCOSE - Normal   TROPONIN    Narrative:     Performed at:  [facility]  [address] [facility] [employer] [address]   Phone [phone]       All other labs were within normal range or not returned as of this dictation.     EMERGENCY DEPARTMENT COURSE and DIFFERENTIAL DIAGNOSIS/MDM:   Vitals:    Vitals:    07/15/20 2102 07/15/20 2106 07/15/20 2218 07/15/20 2250   BP: (!) 171/81 (!) 171/81 (!) 150/79 (!) 178/96   Pulse: 123 121 122 118   Resp: 22 19 27 (!) 31   Temp:       TempSrc:       SpO2: 97% 96% 97% 96%   Weight:       Height:    5' 2\" (1.575 m)           MDM  Number of Diagnoses or Management Options  Glioblastoma ([facility].):   Seizure [facility]):   Diagnosis management comments: DDX: Intracerebral hemorrhage, seizure secondary to glioblastoma, brain edema, subdural hematoma, epidural hematoma, I do not suspect meningitis in this patient without a history of fever, neck stiffness or rash from the . Given her recent diagnosis of glioblastoma is much more likely that she is having a complication from that. The patient has had 2 seizures. She was given 1 mg of Ativan IV x2 that aborted her second seizure. The first 1 stop spontaneously. As of 8:54 PM she had not returned to her normal mental status baseline. Her GCS ranges between 8 and 9 but she continues to have a normal gag so I am reluctant to intubate her. Should she worsen I will intubate her immediately and consider putting on propofol to lower her seizure threshold. After second seizure she was loaded on Keppra IV. The patient was checked several more times in the ED and continued to have a gag reflex. I spoke with Dr. Aaron Ferguson of neurosurgery at [facility] at approximately 9:53 PM.  He would like the patient to be transferred from ER to ER at [facility] where they can see the patient and decide on a disposition regards to which floor they like to put her on. At 9:56 PM I spoke to Dr. Sarah Boswell who is an ER attending at [facility] and is accepted this patient to the emergency department. The transfer center arranged for emergent transportation for this patient. She did not decompensate while waiting on them to come pick her up. NB -this patient has been treated for her glioblastoma at [facility].  In an effort to maintain continuity of care that is why the patient will be transferred to [place] [address]. time was 30 minutes, excluding separately reportable procedures. There was a high probability of clinically significant/life threatening deterioration in the patient's condition which required my urgent intervention. CONSULTS:  None    PROCEDURES:  Unless otherwise noted below, none     Procedures    FINAL IMPRESSION      1.  Seizure ([facility]    2. Glioblastoma ([facility]          DISPOSITION/PLAN DISPOSITION Decision To Transfer 07/15/2020 09:36:16 PM      PATIENT REFERRED TO:  No follow-up provider specified.     DISCHARGE MEDICATIONS:  Discharge Medication List as of 7/15/2020 11:02 PM             (Please note that portions of this note were completed with a voice recognition program.Efforts were made to edit the dictations but occasionally words are mis-transcribed.)    Mary Patel MD (electronically signed)  Attending Emergency Physician         Mary Patel MD  07/15/20 [address], [place]  07/15/20 [address], [place]  07/21/20 0917

## 2020-09-23 ENCOUNTER — HOSPITAL ENCOUNTER (OUTPATIENT)
Age: 60
Setting detail: SPECIMEN
Discharge: HOME | End: 2020-09-23
Payer: OTHER GOVERNMENT

## 2020-09-23 LAB
A/G RATIO: 1.7 (ref 1.1–2.2)
ALBUMIN SERPL-MCNC: 3.5 G/DL (ref 3.4–5)
ALP BLD-CCNC: 116 U/L (ref 40–129)
ALT SERPL-CCNC: 24 U/L (ref 10–40)
ANION GAP SERPL CALCULATED.3IONS-SCNC: 13 MMOL/L (ref 3–16)
AST SERPL-CCNC: 14 U/L (ref 15–37)
BANDED NEUTROPHILS RELATIVE PERCENT: 1 % (ref 0–7)
BASOPHILS ABSOLUTE: 0 K/UL (ref 0–0.2)
BASOPHILS RELATIVE PERCENT: 0 %
BILIRUB SERPL-MCNC: 0.7 MG/DL (ref 0–1)
BUN BLDV-MCNC: 11 MG/DL (ref 7–20)
CALCIUM SERPL-MCNC: 9.4 MG/DL (ref 8.3–10.6)
CHLORIDE BLD-SCNC: 106 MMOL/L (ref 99–110)
CO2: 26 MMOL/L (ref 21–32)
CREAT SERPL-MCNC: 0.6 MG/DL (ref 0.6–1.1)
EOSINOPHILS ABSOLUTE: 0 K/UL (ref 0–0.6)
EOSINOPHILS RELATIVE PERCENT: 0 %
GFR AFRICAN AMERICAN: >60
GFR NON-AFRICAN AMERICAN: >60
GLOBULIN: 2.1 G/DL
GLUCOSE BLD-MCNC: 109 MG/DL (ref 70–99)
HCT VFR BLD CALC: 36.8 % (ref 36–48)
HEMOGLOBIN: 12.5 G/DL (ref 12–16)
LYMPHOCYTES ABSOLUTE: 0.5 K/UL (ref 1–5.1)
LYMPHOCYTES RELATIVE PERCENT: 7 %
MCH RBC QN AUTO: 32.1 PG (ref 26–34)
MCHC RBC AUTO-ENTMCNC: 34 G/DL (ref 31–36)
MCV RBC AUTO: 94.3 FL (ref 80–100)
MONOCYTES ABSOLUTE: 0.5 K/UL (ref 0–1.3)
MONOCYTES RELATIVE PERCENT: 7 %
NEUTROPHILS ABSOLUTE: 6.6 K/UL (ref 1.7–7.7)
NEUTROPHILS RELATIVE PERCENT: 85 %
NUCLEATED RED BLOOD CELLS: 1 /100 WBC
NUCLEATED RED BLOOD CELLS: 1 /100 WBC
PDW BLD-RTO: 18.6 % (ref 12.4–15.4)
PLATELET # BLD: 233 K/UL (ref 135–450)
PLATELET SLIDE REVIEW: ADEQUATE
PMV BLD AUTO: 7.3 FL (ref 5–10.5)
POLYCHROMASIA: ABNORMAL
POTASSIUM SERPL-SCNC: 3 MMOL/L (ref 3.5–5.1)
RBC # BLD: 3.9 M/UL (ref 4–5.2)
SLIDE REVIEW: ABNORMAL
SODIUM BLD-SCNC: 145 MMOL/L (ref 136–145)
TOTAL PROTEIN: 5.6 G/DL (ref 6.4–8.2)
WBC # BLD: 7.7 K/UL (ref 4–11)

## 2020-09-23 PROCEDURE — 36415 COLL VENOUS BLD VENIPUNCTURE: CPT

## 2020-09-23 PROCEDURE — 80053 COMPREHEN METABOLIC PANEL: CPT

## 2020-09-23 PROCEDURE — 85025 COMPLETE CBC W/AUTO DIFF WBC: CPT

## 2020-09-25 ENCOUNTER — TELEPHONE (OUTPATIENT)
Dept: INTERNAL MEDICINE CLINIC | Age: 60
End: 2020-09-25

## 2020-09-25 NOTE — TELEPHONE ENCOUNTER
Cristin Spring --Team Mgr at [facility] calling---seeing if you would sign order for pt to be admitted and if you want to follow her care there or if you would want the [address] to take over--please call Cristin Spring at [phone]. Thanks.

## 2025-05-09 NOTE — TELEPHONE ENCOUNTER
Called pt to advise she will need to call me when she figures out where she will be going to have the mammogram. [facility] will not need to [facility] an order. She will also let me know who she will be seeing for gynecology and we will need to send the referral then. yes

## (undated) DEVICE — 3M™ WARMING BLANKET, LOWER BODY, 10 PER CASE, 42568: Brand: BAIR HUGGER™

## (undated) DEVICE — SUTURE ETHLN SZ 4-0 L18IN NONABSORBABLE BLK L19MM PS-2 3/8 1667H

## (undated) DEVICE — TRAY PREP DRY W/ PREM GLV 2 APPL 6 SPNG 2 UNDPD 1 OVERWRAP

## (undated) DEVICE — GAUZE,PACKING STRIP,PLAIN,1/4"X5YD,STRL: Brand: CURAD

## (undated) DEVICE — MEDICINE CUP, GRADUATED, STER: Brand: MEDLINE

## (undated) DEVICE — CURITY PLAIN PACKING STRIP: Brand: CURITY

## (undated) DEVICE — HYPODERMIC SAFETY NEEDLE: Brand: MAGELLAN

## (undated) DEVICE — ROOM TURNOVER KIT W/ ARM STRP

## (undated) DEVICE — BLADE CLIPPER GEN PURP NS

## (undated) DEVICE — DRAPE ADOLESCENT  LAPAROTOMY

## (undated) DEVICE — TOWEL,OR,DSP,ST,BLUE,STD,4/PK,20PK/CS: Brand: MEDLINE

## (undated) DEVICE — SYRINGE, LUER LOCK, 10ML: Brand: MEDLINE

## (undated) DEVICE — 3 ML SYRINGE LUER-LOCK TIP: Brand: MONOJECT

## (undated) DEVICE — Device

## (undated) DEVICE — GLOVE ORANGE PI 7 1/2   MSG9075

## (undated) DEVICE — BLADE, TONGUE, 6", STERILE: Brand: MEDLINE

## (undated) DEVICE — PACK PROCEDURE SURG EXTREMITY MFFOP CUST

## (undated) DEVICE — COTTON BALLS: Brand: DEROYAL

## (undated) DEVICE — CATHETER IV 14 GA TRPL BVL LUERLOCK TIP RADIOPAQUE ANGIOCATH

## (undated) DEVICE — #1020 STERI DRAPE 41MM X 41MM SMALL: Brand: STERI-DRAPE™

## (undated) DEVICE — WIPE INSTR W73XL73CM VISITEC

## (undated) DEVICE — SOLUTION IV IRRIG 500ML 0.9% SODIUM CHL 2F7123